# Patient Record
Sex: FEMALE | Race: WHITE | Employment: OTHER
[De-identification: names, ages, dates, MRNs, and addresses within clinical notes are randomized per-mention and may not be internally consistent; named-entity substitution may affect disease eponyms.]

---

## 2017-01-10 ENCOUNTER — OFFICE VISIT (OUTPATIENT)
Dept: FAMILY MEDICINE CLINIC | Facility: CLINIC | Age: 67
End: 2017-01-10

## 2017-01-10 VITALS
HEIGHT: 65 IN | WEIGHT: 202 LBS | SYSTOLIC BLOOD PRESSURE: 146 MMHG | HEART RATE: 65 BPM | BODY MASS INDEX: 33.66 KG/M2 | DIASTOLIC BLOOD PRESSURE: 78 MMHG

## 2017-01-10 DIAGNOSIS — K44.9 HIATAL HERNIA: Primary | ICD-10-CM

## 2017-01-10 DIAGNOSIS — E78.00 HYPERCHOLESTEREMIA: ICD-10-CM

## 2017-01-10 DIAGNOSIS — E55.9 VITAMIN D DEFICIENCY: ICD-10-CM

## 2017-01-10 PROCEDURE — 99213 OFFICE O/P EST LOW 20 MIN: CPT | Performed by: FAMILY MEDICINE

## 2017-01-10 RX ORDER — ATORVASTATIN CALCIUM 40 MG/1
TABLET, FILM COATED ORAL
Qty: 90 TABLET | Refills: 3 | Status: SHIPPED | OUTPATIENT
Start: 2017-01-10 | End: 2017-11-30 | Stop reason: SDUPTHER

## 2017-01-10 RX ORDER — LEVOTHYROXINE SODIUM 175 UG/1
TABLET ORAL
Qty: 97 TABLET | Refills: 2 | Status: SHIPPED | OUTPATIENT
Start: 2017-01-10 | End: 2017-09-07 | Stop reason: SDUPTHER

## 2017-01-10 RX ORDER — METOPROLOL SUCCINATE 50 MG/1
TABLET, EXTENDED RELEASE ORAL
Qty: 90 TABLET | Refills: 2 | Status: SHIPPED | OUTPATIENT
Start: 2017-01-10 | End: 2017-09-07 | Stop reason: SDUPTHER

## 2017-01-10 RX ORDER — NYSTATIN 100000 [USP'U]/G
POWDER TOPICAL
Qty: 90 G | Refills: 4 | Status: SHIPPED | OUTPATIENT
Start: 2017-01-10 | End: 2017-10-02 | Stop reason: SDUPTHER

## 2017-01-10 RX ORDER — OMEPRAZOLE 20 MG/1
CAPSULE, DELAYED RELEASE ORAL
Qty: 90 CAPSULE | Refills: 1 | Status: SHIPPED | OUTPATIENT
Start: 2017-01-10 | End: 2017-07-10 | Stop reason: SDUPTHER

## 2017-01-10 RX ORDER — VENLAFAXINE 37.5 MG/1
TABLET ORAL
Qty: 180 TABLET | Refills: 2 | Status: SHIPPED | OUTPATIENT
Start: 2017-01-10 | End: 2017-09-08 | Stop reason: SDUPTHER

## 2017-01-10 RX ORDER — ALENDRONATE SODIUM 70 MG/1
TABLET ORAL
Qty: 12 TABLET | Refills: 3 | Status: SHIPPED | OUTPATIENT
Start: 2017-01-10 | End: 2017-03-30 | Stop reason: ALTCHOICE

## 2017-01-10 ASSESSMENT — ENCOUNTER SYMPTOMS
COUGH: 0
DIARRHEA: 0
TROUBLE SWALLOWING: 0
CONSTIPATION: 0
RHINORRHEA: 0
SORE THROAT: 0
SHORTNESS OF BREATH: 0
NAUSEA: 0
BLOOD IN STOOL: 0
ABDOMINAL PAIN: 0
WHEEZING: 0
VOMITING: 0

## 2017-03-06 ENCOUNTER — HOSPITAL ENCOUNTER (OUTPATIENT)
Age: 67
Setting detail: SPECIMEN
Discharge: HOME | End: 2017-03-06
Payer: MEDICARE

## 2017-03-06 ENCOUNTER — TELEPHONE (OUTPATIENT)
Dept: FAMILY MEDICINE CLINIC | Facility: CLINIC | Age: 67
End: 2017-03-06

## 2017-03-06 DIAGNOSIS — R30.0 DYSURIA: ICD-10-CM

## 2017-03-06 DIAGNOSIS — R30.0 DYSURIA: Primary | ICD-10-CM

## 2017-03-06 DIAGNOSIS — R31.29 MICROHEMATURIA: ICD-10-CM

## 2017-03-06 LAB
-: ABNORMAL
AMORPHOUS: ABNORMAL
BACTERIA: ABNORMAL
BILIRUBIN URINE: NEGATIVE
CASTS UA: ABNORMAL /LPF (ref 0–8)
COLOR: YELLOW
CRYSTALS, UA: ABNORMAL /HPF
EPITHELIAL CELLS UA: ABNORMAL /HPF (ref 0–5)
GLUCOSE URINE: NEGATIVE
KETONES, URINE: NEGATIVE
LEUKOCYTE ESTERASE, URINE: NEGATIVE
MUCUS: ABNORMAL
NITRITE, URINE: NEGATIVE
OTHER OBSERVATIONS UA: ABNORMAL
PH UA: 8.5 (ref 5–8)
PROTEIN UA: NEGATIVE
RBC UA: ABNORMAL /HPF (ref 0–4)
RENAL EPITHELIAL, UA: ABNORMAL /HPF
SPECIFIC GRAVITY UA: 1.01 (ref 1–1.03)
TRICHOMONAS: ABNORMAL
TURBIDITY: CLEAR
URINE HGB: NEGATIVE
UROBILINOGEN, URINE: NORMAL
WBC UA: ABNORMAL /HPF (ref 0–5)
YEAST: ABNORMAL

## 2017-03-07 LAB
CULTURE: NORMAL
CULTURE: NORMAL
Lab: NORMAL
SPECIMEN DESCRIPTION: NORMAL
STATUS: NORMAL

## 2017-03-30 ENCOUNTER — HOSPITAL ENCOUNTER (OUTPATIENT)
Age: 67
Setting detail: SPECIMEN
Discharge: HOME | End: 2017-03-30
Payer: MEDICARE

## 2017-03-30 ENCOUNTER — OFFICE VISIT (OUTPATIENT)
Dept: UROLOGY | Age: 67
End: 2017-03-30
Payer: MEDICARE

## 2017-03-30 VITALS
DIASTOLIC BLOOD PRESSURE: 82 MMHG | HEART RATE: 66 BPM | WEIGHT: 202.82 LBS | BODY MASS INDEX: 33.79 KG/M2 | SYSTOLIC BLOOD PRESSURE: 137 MMHG | HEIGHT: 65 IN | TEMPERATURE: 98.1 F

## 2017-03-30 DIAGNOSIS — R31.29 MICROHEMATURIA: Primary | ICD-10-CM

## 2017-03-30 PROCEDURE — 99204 OFFICE O/P NEW MOD 45 MIN: CPT | Performed by: UROLOGY

## 2017-03-30 ASSESSMENT — ENCOUNTER SYMPTOMS
ABDOMINAL PAIN: 0
COLOR CHANGE: 0
EYE REDNESS: 0
NAUSEA: 0
VOMITING: 0
SHORTNESS OF BREATH: 0
BACK PAIN: 0
EYE PAIN: 0
COUGH: 0
WHEEZING: 0

## 2017-04-05 LAB — UROTHELIAL CANCER DETECTION: NORMAL

## 2017-04-13 ENCOUNTER — OFFICE VISIT (OUTPATIENT)
Dept: FAMILY MEDICINE CLINIC | Age: 67
End: 2017-04-13
Payer: MEDICARE

## 2017-04-13 VITALS
WEIGHT: 200 LBS | BODY MASS INDEX: 33.32 KG/M2 | SYSTOLIC BLOOD PRESSURE: 130 MMHG | HEART RATE: 63 BPM | DIASTOLIC BLOOD PRESSURE: 70 MMHG | RESPIRATION RATE: 18 BRPM | HEIGHT: 65 IN | OXYGEN SATURATION: 97 %

## 2017-04-13 DIAGNOSIS — E78.00 HYPERCHOLESTEREMIA: ICD-10-CM

## 2017-04-13 DIAGNOSIS — E55.9 VITAMIN D DEFICIENCY: ICD-10-CM

## 2017-04-13 DIAGNOSIS — K44.9 HIATAL HERNIA: Primary | ICD-10-CM

## 2017-04-13 DIAGNOSIS — R31.29 MICROHEMATURIA: ICD-10-CM

## 2017-04-13 PROCEDURE — 99213 OFFICE O/P EST LOW 20 MIN: CPT | Performed by: FAMILY MEDICINE

## 2017-04-13 ASSESSMENT — ENCOUNTER SYMPTOMS
VOMITING: 0
DIARRHEA: 0
ABDOMINAL PAIN: 0
WHEEZING: 0
SHORTNESS OF BREATH: 0
BLOOD IN STOOL: 0
COUGH: 0

## 2017-04-18 ENCOUNTER — HOSPITAL ENCOUNTER (OUTPATIENT)
Dept: ULTRASOUND IMAGING | Age: 67
Discharge: HOME | End: 2017-04-18
Payer: MEDICARE

## 2017-04-18 DIAGNOSIS — R31.29 MICROHEMATURIA: ICD-10-CM

## 2017-04-18 PROCEDURE — 76775 US EXAM ABDO BACK WALL LIM: CPT

## 2017-05-04 ENCOUNTER — OFFICE VISIT (OUTPATIENT)
Dept: UROLOGY | Age: 67
End: 2017-05-04
Payer: MEDICARE

## 2017-05-04 VITALS
SYSTOLIC BLOOD PRESSURE: 144 MMHG | DIASTOLIC BLOOD PRESSURE: 86 MMHG | HEART RATE: 63 BPM | TEMPERATURE: 98.2 F | WEIGHT: 200.62 LBS | HEIGHT: 65 IN | BODY MASS INDEX: 33.43 KG/M2

## 2017-05-04 DIAGNOSIS — R31.9 HEMATURIA: Primary | ICD-10-CM

## 2017-05-04 PROCEDURE — 99213 OFFICE O/P EST LOW 20 MIN: CPT | Performed by: UROLOGY

## 2017-05-04 ASSESSMENT — ENCOUNTER SYMPTOMS
NAUSEA: 0
VOMITING: 0
EYE REDNESS: 0
EYE PAIN: 0
WHEEZING: 0
ABDOMINAL PAIN: 0
SHORTNESS OF BREATH: 0
BACK PAIN: 0
COUGH: 0
COLOR CHANGE: 0

## 2017-07-11 RX ORDER — OMEPRAZOLE 20 MG/1
CAPSULE, DELAYED RELEASE ORAL
Qty: 90 CAPSULE | Refills: 1 | Status: SHIPPED | OUTPATIENT
Start: 2017-07-11 | End: 2017-12-11 | Stop reason: SDUPTHER

## 2017-07-14 ENCOUNTER — OFFICE VISIT (OUTPATIENT)
Dept: FAMILY MEDICINE CLINIC | Age: 67
End: 2017-07-14
Payer: MEDICARE

## 2017-07-14 VITALS
WEIGHT: 198 LBS | DIASTOLIC BLOOD PRESSURE: 80 MMHG | OXYGEN SATURATION: 97 % | SYSTOLIC BLOOD PRESSURE: 133 MMHG | HEART RATE: 78 BPM | HEIGHT: 66 IN | BODY MASS INDEX: 31.82 KG/M2

## 2017-07-14 DIAGNOSIS — Z00.00 HEALTHCARE MAINTENANCE: ICD-10-CM

## 2017-07-14 DIAGNOSIS — E89.0 POSTABLATIVE HYPOTHYROIDISM: ICD-10-CM

## 2017-07-14 DIAGNOSIS — I25.10 CORONARY ARTERY DISEASE INVOLVING NATIVE CORONARY ARTERY OF NATIVE HEART WITHOUT ANGINA PECTORIS: Primary | ICD-10-CM

## 2017-07-14 DIAGNOSIS — E78.00 HYPERCHOLESTEREMIA: ICD-10-CM

## 2017-07-14 DIAGNOSIS — Z23 NEED FOR PNEUMOCOCCAL VACCINE: ICD-10-CM

## 2017-07-14 PROCEDURE — 90732 PPSV23 VACC 2 YRS+ SUBQ/IM: CPT | Performed by: INTERNAL MEDICINE

## 2017-07-14 PROCEDURE — 99214 OFFICE O/P EST MOD 30 MIN: CPT | Performed by: INTERNAL MEDICINE

## 2017-07-14 PROCEDURE — G0009 ADMIN PNEUMOCOCCAL VACCINE: HCPCS | Performed by: INTERNAL MEDICINE

## 2017-07-14 ASSESSMENT — PATIENT HEALTH QUESTIONNAIRE - PHQ9
2. FEELING DOWN, DEPRESSED OR HOPELESS: 0
SUM OF ALL RESPONSES TO PHQ QUESTIONS 1-9: 0
SUM OF ALL RESPONSES TO PHQ9 QUESTIONS 1 & 2: 0
1. LITTLE INTEREST OR PLEASURE IN DOING THINGS: 0

## 2017-07-27 ENCOUNTER — HOSPITAL ENCOUNTER (OUTPATIENT)
Age: 67
Setting detail: SPECIMEN
Discharge: HOME | End: 2017-07-27
Payer: MEDICARE

## 2017-07-27 LAB
THYROXINE, FREE: 1.43 NG/DL (ref 0.93–1.7)
TSH SERPL DL<=0.05 MIU/L-ACNC: 0.37 MIU/L (ref 0.3–5)

## 2017-07-28 LAB
THYROGLOBULIN AB: <20 IU/ML (ref 0–40)
THYROGLOBULIN: 0.2 NG/ML (ref 0–63.4)

## 2017-08-02 ENCOUNTER — HOSPITAL ENCOUNTER (OUTPATIENT)
Age: 67
Setting detail: SPECIMEN
Discharge: HOME | End: 2017-08-02
Payer: MEDICARE

## 2017-08-02 LAB — CALCIUM SERPL-MCNC: 9 MG/DL (ref 8.6–10.4)

## 2017-09-07 RX ORDER — LEVOTHYROXINE SODIUM 175 UG/1
TABLET ORAL
Qty: 97 TABLET | Refills: 3 | Status: SHIPPED | OUTPATIENT
Start: 2017-09-07 | End: 2018-07-19 | Stop reason: SDUPTHER

## 2017-09-07 RX ORDER — METOPROLOL SUCCINATE 50 MG/1
TABLET, EXTENDED RELEASE ORAL
Qty: 90 TABLET | Refills: 5 | Status: SHIPPED | OUTPATIENT
Start: 2017-09-07 | End: 2018-10-10 | Stop reason: SDUPTHER

## 2017-09-08 RX ORDER — VENLAFAXINE 37.5 MG/1
TABLET ORAL
Qty: 180 TABLET | Refills: 2 | Status: SHIPPED | OUTPATIENT
Start: 2017-09-08 | End: 2018-05-10 | Stop reason: SDUPTHER

## 2017-09-28 ENCOUNTER — HOSPITAL ENCOUNTER (OUTPATIENT)
Age: 67
Setting detail: SPECIMEN
Discharge: HOME | End: 2017-09-28
Payer: MEDICARE

## 2017-09-28 LAB
CALCIUM IONIZED: 1.24 MMOL/L (ref 1.13–1.33)
PTH INTACT: 31.6 PG/ML (ref 15–65)
THYROXINE, FREE: 1.24 NG/DL (ref 0.93–1.7)
TSH SERPL DL<=0.05 MIU/L-ACNC: 3.25 MIU/L (ref 0.3–5)

## 2017-09-29 LAB
CALCIUM SERPL-MCNC: 9.5 MG/DL (ref 8.6–10.4)
VITAMIN D 25-HYDROXY: 40.8 NG/ML (ref 30–100)

## 2017-10-02 RX ORDER — NYSTATIN 100000 [USP'U]/G
POWDER TOPICAL
Qty: 90 G | Refills: 4 | Status: SHIPPED | OUTPATIENT
Start: 2017-10-02 | End: 2017-11-07 | Stop reason: SDUPTHER

## 2017-11-06 RX ORDER — NYSTATIN 100000 [USP'U]/G
POWDER TOPICAL
Qty: 90 G | Refills: 5 | OUTPATIENT
Start: 2017-11-06

## 2017-11-06 NOTE — TELEPHONE ENCOUNTER
From: Yu Pantoja  Sent: 11/4/2017 11:07 PM EDT  Subject: Medication Renewal Request    Yu Pantoja would like a refill of the following medications:  nystatin [facility]) [id number] UNIT/GM powder Annamarie Lynne NP]    Preferred pharmacy: [address] [place] [address] [phone] - F [phone]    Comment:

## 2017-11-07 RX ORDER — NYSTATIN 100000 [USP'U]/G
POWDER TOPICAL
Qty: 90 G | Refills: 4 | Status: SHIPPED | OUTPATIENT
Start: 2017-11-07 | End: 2018-07-19 | Stop reason: SDUPTHER

## 2017-11-07 NOTE — TELEPHONE ENCOUNTER
From: Nona Harvey  To: Sherice Stokes NP  Sent: 11/7/2017 9:19 AM EST  Subject: Medication Renewal Request    No. According to Sandeep Tellez, Dr. Chrissy Gusman office DID NOT renew my prescription back on October 1, and therefore the medication did not ship, and therefore I am now OUT. If there is a means for attaching a screenshot from [employer] confirming this, I will gladly send. Please address this oversight ASAP. Thx.  ----- Message -----  From: Franca Styles  Sent: 11/6/2017 9:13 AM EST  To: Nona Harvey  Subject: RE: Medication Renewal Request  Medication was denied because you filled on 10/2/17 with 4 refills. Call your pharmacy to ask for them to refill the medication you have 4 refills.     ----- Message -----   From: Nona Harvey   Sent: 11/4/2017 11:07 PM EDT   To: Jerry Garrison NP  Subject: Medication Renewal Request    Nona Harvey would like a refill of the following medications:  nystatin [facility]) [id number] UNIT/GM powder Jerry Garrison NP]    Preferred pharmacy: [address] [place] [facility] [phone] - F [phone]    Comment:

## 2017-11-15 ENCOUNTER — OFFICE VISIT (OUTPATIENT)
Dept: FAMILY MEDICINE CLINIC | Age: 67
End: 2017-11-15
Payer: MEDICARE

## 2017-11-15 VITALS
SYSTOLIC BLOOD PRESSURE: 130 MMHG | BODY MASS INDEX: 33.73 KG/M2 | TEMPERATURE: 97.6 F | HEART RATE: 70 BPM | DIASTOLIC BLOOD PRESSURE: 82 MMHG | WEIGHT: 205.8 LBS | OXYGEN SATURATION: 97 %

## 2017-11-15 DIAGNOSIS — Z23 NEED FOR INFLUENZA VACCINATION: ICD-10-CM

## 2017-11-15 DIAGNOSIS — Z00.00 HEALTH CARE MAINTENANCE: ICD-10-CM

## 2017-11-15 DIAGNOSIS — E89.0 POSTABLATIVE HYPOTHYROIDISM: ICD-10-CM

## 2017-11-15 DIAGNOSIS — I25.10 CORONARY ARTERY DISEASE INVOLVING NATIVE CORONARY ARTERY OF NATIVE HEART WITHOUT ANGINA PECTORIS: Primary | ICD-10-CM

## 2017-11-15 DIAGNOSIS — F33.41 RECURRENT MAJOR DEPRESSIVE DISORDER, IN PARTIAL REMISSION (HCC): ICD-10-CM

## 2017-11-15 DIAGNOSIS — R12 HEARTBURN: ICD-10-CM

## 2017-11-15 DIAGNOSIS — E78.00 HYPERCHOLESTEREMIA: ICD-10-CM

## 2017-11-15 DIAGNOSIS — M81.0 AGE RELATED OSTEOPOROSIS, UNSPECIFIED PATHOLOGICAL FRACTURE PRESENCE: ICD-10-CM

## 2017-11-15 PROCEDURE — 90662 IIV NO PRSV INCREASED AG IM: CPT | Performed by: INTERNAL MEDICINE

## 2017-11-15 PROCEDURE — 99214 OFFICE O/P EST MOD 30 MIN: CPT | Performed by: INTERNAL MEDICINE

## 2017-11-15 PROCEDURE — G0008 ADMIN INFLUENZA VIRUS VAC: HCPCS | Performed by: INTERNAL MEDICINE

## 2017-11-15 NOTE — PROGRESS NOTES
Subjective:       Patient ID: Mayte Melchor is a 79 y.o. female who presents for   Chief Complaint   Patient presents with    Flu Vaccine    Follow-up     thyroid   , and follow up of chronic medical problems. HPI:  Hypothyroidism  Patient complains of hypothyroidism. Current symptoms: none. Patient denies change in energy level, diarrhea, heat / cold intolerance, nervousness, palpitations and weight changes. Onset of symptoms was several years ago. Symptoms have been well-controlled. H/o thyroid cancer, following with endo. Additional Complaints:  Dyslipidemia  Patient presents for evaluation of lipids. Compliance with treatment thus far has been good. A repeat fasting lipid profile was done. The patient does use medications that may worsen dyslipidemias (corticosteroids, progestins, anabolic steroids, diuretics, beta-blockers, amiodarone, cyclosporine, olanzapine). The patient exercises frequently. The patient is not known to have coexisting coronary artery disease. Hypertension  Patient is here for follow-up of elevated blood pressure. She is exercising and is adherent to a low-salt diet. Blood pressure is well controlled at home. Cardiac symptoms: none. Patient denies chest pain, chest pressure/discomfort, claudication, dyspnea, exertional chest pressure/discomfort, fatigue, irregular heart beat, lower extremity edema, near-syncope, orthopnea and palpitations. Cardiovascular risk factors: advanced age (older than 54 for men, 72 for women), dyslipidemia, hypertension and obesity (BMI >= 30 kg/m2). Use of agents associated with hypertension: thyroid hormones. History of target organ damage: none. Hip fracture last year, completed rehab, doing well  Has had one dose of prolia so far. Remains on effexor for depression, working well. Has been on the omeprazole for a long time for heartburn, has tried pepcid years ago and it didn't.     Patient's medications, allergies, past medical, surgical, Anatsasia Tidwell MD   metoprolol succinate (TOPROL XL) 50 MG extended release tablet TAKE ONE TABLET BY MOUTH DAILY Yes Ludy Tidwell MD   levothyroxine (SYNTHROID) 175 MCG tablet TAKE ONE TABLET BY MOUTH DAILY EXCEPT ON Saturday TAKE ONE AND ONE-HALF TABLET AS DIRECTED Yes Ludy Tidwell MD   omeprazole (PRILOSEC) 20 MG delayed release capsule TAKE ONE CAPSULE BY MOUTH EVERY MORNING BEFORE BREAKFAST Yes Tapan Milian CNP   denosumab (PROLIA) 60 MG/ML SOLN SC injection Inject 60 mg into the skin once Every 6 months Yes Historical Provider, MD   vitamin D (CHOLECALCIFEROL) 1000 UNIT TABS tablet Take 1,000 Units by mouth daily Yes Historical Provider, MD   atorvastatin (LIPITOR) 40 MG tablet TAKE ONE TABLET BY MOUTH ONE TIME A DAY Yes Marion Tabares MD   ibuprofen (ADVIL;MOTRIN) 200 MG tablet Take 200 mg by mouth every 6 hours as needed for Pain. Yes Historical Provider, MD   calcium carbonate (OSCAL) 500 MG TABS tablet Take 500 mg by mouth 2 times daily. Yes Historical Provider, MD   vitamin E 400 UNIT capsule Take 400 Units by mouth daily. Yes Historical Provider, MD   aspirin 81 MG tablet Take 81 mg by mouth daily. Yes Historical Provider, MD          Assessment/Plan:       1. Coronary artery disease involving native coronary artery of native heart without angina pectoris  - continue Toprol XL 50mg QD and aspirin 81mg QD     2. Hypercholesteremia  - continue lipitor 40mg QHS     3. Postablative hypothyroidism  - continue synthroid 175mcg QD     4. Age related osteoporosis, unspecified pathological fracture presence  - continue prolia per gyn     5. Heartburn  - continue prilosec 20mg QD, consider H2 blocker trial [place]     6. Recurrent major depressive disorder, in partial remission (HCC)  - continue effexor 37.5mg PO BID    7. Health care maintenance  - INFLUENZA, HIGH DOSE, 65 YRS +, IM, PF, PREFILL SYR, 0.5ML (FLUZONE HD)    8.  Need for influenza vaccination  - INFLUENZA, HIGH DOSE, 65 YRS +, IM, PF, PREFILL SYR, 0.5ML (FLUZONE HD)             Electronically signed by Alma Borrero MD on 11/15/2017 at 1:36 PM

## 2017-11-30 RX ORDER — ATORVASTATIN CALCIUM 40 MG/1
TABLET, FILM COATED ORAL
Qty: 90 TABLET | Refills: 1 | Status: SHIPPED | OUTPATIENT
Start: 2017-11-30 | End: 2018-05-15 | Stop reason: SDUPTHER

## 2017-12-12 RX ORDER — OMEPRAZOLE 20 MG/1
CAPSULE, DELAYED RELEASE ORAL
Qty: 90 CAPSULE | Refills: 1 | Status: SHIPPED | OUTPATIENT
Start: 2017-12-12 | End: 2018-05-21 | Stop reason: SDUPTHER

## 2017-12-12 NOTE — TELEPHONE ENCOUNTER
Last filled 7/11/17 #90 with 1 RF  Last seen 11/15/17    Next Visit Date:  Future Appointments  Date Time Provider Michael Livingston   3/15/2018 1:00 PM Ludy Blackwell  [address] Maintenance   Topic Date Due    Breast cancer screen  05/10/2018    Lipid screen  01/26/2022    Colon cancer screen colonoscopy  11/07/2024    DTaP/Tdap/Td vaccine (2 - Td) 01/19/2025    Zostavax vaccine  Completed    DEXA (modify frequency per FRAX score)  Completed    Flu vaccine  Completed    Pneumococcal low/med risk  Completed    Hepatitis C screen  Completed       No results found for: LABA1C          ( goal A1C is < 7)   No results found for: LABMICR  LDL Cholesterol (mg/dL)   Date Value   01/26/2017 61       (goal LDL is <100)   AST (U/L)   Date Value   12/05/2016 23     ALT (U/L)   Date Value   12/05/2016 30     BUN (mg/dL)   Date Value   12/05/2016 13     BP Readings from Last 3 Encounters:   11/15/17 130/82   07/14/17 133/80   05/04/17 (!) 144/86          (goal 120/80)    All Future Testing planned in [facility]  Lab Frequency Next Occurrence               Patient Active Problem List:     CAD (coronary artery disease)     Hypercholesteremia     Hearing loss     Hiatal hernia     Proximal humerus fracture     Femoral fracture (HCC)

## 2018-02-14 ENCOUNTER — OFFICE VISIT (OUTPATIENT)
Dept: FAMILY MEDICINE CLINIC | Age: 68
End: 2018-02-14
Payer: MEDICARE

## 2018-02-14 VITALS
WEIGHT: 202.2 LBS | RESPIRATION RATE: 16 BRPM | BODY MASS INDEX: 32.5 KG/M2 | HEART RATE: 58 BPM | SYSTOLIC BLOOD PRESSURE: 126 MMHG | OXYGEN SATURATION: 98 % | TEMPERATURE: 97.2 F | DIASTOLIC BLOOD PRESSURE: 74 MMHG | HEIGHT: 66 IN

## 2018-02-14 DIAGNOSIS — R07.9 CHEST PAIN, UNSPECIFIED TYPE: ICD-10-CM

## 2018-02-14 DIAGNOSIS — R07.9 CHEST PAIN, UNSPECIFIED TYPE: Primary | ICD-10-CM

## 2018-02-14 PROCEDURE — 93000 ELECTROCARDIOGRAM COMPLETE: CPT | Performed by: INTERNAL MEDICINE

## 2018-02-14 PROCEDURE — 99213 OFFICE O/P EST LOW 20 MIN: CPT | Performed by: INTERNAL MEDICINE

## 2018-02-14 NOTE — PROGRESS NOTES
tenderness to palpation noted. Large pendulous breasts. Bra is too small, breasts spilling out of bra cups. Cardiovascular: PMI is not displaced, and no thrill noted. Regular rate and rhythm with no rub, murmur or gallop. There is no peripheral edema. Pedal pulses are normal.   Abdomen: Abdomen is soft and nontender. The bowel sounds are normal.   Musculoskeletal: There are no deformities of the the extremities. Patient has all ten fingers intact. The patient has full range of motion on all 4 extremities without pain. Skin: The skin is warm and dry. There are no rashes noted. Prior to Visit Medications    Medication Sig Taking? Authorizing Provider   Multiple Vitamins-Minerals (MULTIVITAMIN PO) Take by mouth Yes Historical Provider, MD   omeprazole (PRILOSEC) 20 MG delayed release capsule TAKE 1 CAPSULE EVERY MORNING BEFORE BREAKFAST Yes Ludy Jean Baptiste MD   atorvastatin (LIPITOR) 40 MG tablet TAKE 1 TABLET DAILY Yes Ludy Jean Baptiste MD   nystatin ([id number]) [id number] UNIT/GM powder APPLY TO AFFECTED AREA 3 TIMES A DAY Yes Ludy Jean Baptiste MD   venlafaxine (EFFEXOR) 37.5 MG tablet TAKE ONE TABLET BY MOUTH TWICE A DAY Yes Ludy Jean Baptiste MD   metoprolol succinate (TOPROL XL) 50 MG extended release tablet TAKE ONE TABLET BY MOUTH DAILY Yes Ludy Jean Baptiste MD   levothyroxine (SYNTHROID) 175 MCG tablet TAKE ONE TABLET BY MOUTH DAILY EXCEPT ON Saturday TAKE ONE AND ONE-HALF TABLET AS DIRECTED Yes Ludy Jean Baptiste MD   denosumab (PROLIA) 60 MG/ML SOLN SC injection Inject 60 mg into the skin once Every 6 months Yes Historical Provider, MD   vitamin D (CHOLECALCIFEROL) 1000 UNIT TABS tablet Take 1,000 Units by mouth daily Yes Historical Provider, MD   ibuprofen (ADVIL;MOTRIN) 200 MG tablet Take 200 mg by mouth every 6 hours as needed for Pain. Yes Historical Provider, MD   calcium carbonate (OSCAL) 500 MG TABS tablet Take 500 mg by mouth 2 times daily.  Yes Historical Provider, MD   vitamin E 400 UNIT capsule Take 400 Units by mouth daily. Yes Historical Provider, MD   aspirin 81 MG tablet Take 81 mg by mouth daily. Yes Historical Provider, MD       Data Review EKG unchanged from prior in 2016     Assessment/Plan:      1. Chest pain, unspecified type  Unlikely from cardiac source  More likely due to breast ligament strain   Bra fitting advised, reviewed with patient. - EKG 12 lead;  Future           Electronically signed by Tino Allen MD on 2/14/2018 at 3:34 PM

## 2018-02-22 ENCOUNTER — HOSPITAL ENCOUNTER (OUTPATIENT)
Age: 68
Setting detail: SPECIMEN
Discharge: HOME | End: 2018-02-22
Payer: MEDICARE

## 2018-02-22 LAB
CA 125: 19 U/ML
PREALBUMIN: 29.7 MG/DL (ref 20–40)

## 2018-03-16 ENCOUNTER — OFFICE VISIT (OUTPATIENT)
Dept: FAMILY MEDICINE CLINIC | Age: 68
End: 2018-03-16
Payer: MEDICARE

## 2018-03-16 VITALS
OXYGEN SATURATION: 96 % | WEIGHT: 204.2 LBS | RESPIRATION RATE: 16 BRPM | DIASTOLIC BLOOD PRESSURE: 80 MMHG | TEMPERATURE: 96.2 F | BODY MASS INDEX: 33.45 KG/M2 | SYSTOLIC BLOOD PRESSURE: 142 MMHG | HEART RATE: 63 BPM

## 2018-03-16 DIAGNOSIS — E03.9 ACQUIRED HYPOTHYROIDISM: ICD-10-CM

## 2018-03-16 DIAGNOSIS — I25.10 CORONARY ARTERY DISEASE INVOLVING NATIVE CORONARY ARTERY OF NATIVE HEART WITHOUT ANGINA PECTORIS: ICD-10-CM

## 2018-03-16 DIAGNOSIS — M81.0 OSTEOPOROSIS WITHOUT CURRENT PATHOLOGICAL FRACTURE, UNSPECIFIED OSTEOPOROSIS TYPE: ICD-10-CM

## 2018-03-16 DIAGNOSIS — E78.00 HYPERCHOLESTEREMIA: Primary | ICD-10-CM

## 2018-03-16 DIAGNOSIS — I10 ESSENTIAL HYPERTENSION: ICD-10-CM

## 2018-03-16 PROCEDURE — 99214 OFFICE O/P EST MOD 30 MIN: CPT | Performed by: INTERNAL MEDICINE

## 2018-03-29 ENCOUNTER — OFFICE VISIT (OUTPATIENT)
Dept: UROLOGY | Age: 68
End: 2018-03-29
Payer: MEDICARE

## 2018-03-29 VITALS
SYSTOLIC BLOOD PRESSURE: 145 MMHG | DIASTOLIC BLOOD PRESSURE: 89 MMHG | TEMPERATURE: 98.3 F | HEIGHT: 66 IN | WEIGHT: 205 LBS | HEART RATE: 61 BPM | BODY MASS INDEX: 32.95 KG/M2

## 2018-03-29 DIAGNOSIS — N28.1 RENAL CYSTS, ACQUIRED, BILATERAL: ICD-10-CM

## 2018-03-29 DIAGNOSIS — R31.21 ASYMPTOMATIC MICROSCOPIC HEMATURIA: Primary | ICD-10-CM

## 2018-03-29 PROCEDURE — 99213 OFFICE O/P EST LOW 20 MIN: CPT | Performed by: UROLOGY

## 2018-03-29 ASSESSMENT — ENCOUNTER SYMPTOMS
BACK PAIN: 0
EYE PAIN: 0
COUGH: 0
VOMITING: 0
COLOR CHANGE: 0
NAUSEA: 0
EYE REDNESS: 0
WHEEZING: 0
ABDOMINAL PAIN: 0
SHORTNESS OF BREATH: 0

## 2018-03-29 NOTE — PROGRESS NOTES
Constitutional: Negative for appetite change, chills and fever. Eyes: Negative for pain, redness and visual disturbance. Respiratory: Negative for cough, shortness of breath and wheezing. Cardiovascular: Negative for chest pain and leg swelling. Gastrointestinal: Negative for abdominal pain, nausea and vomiting. Genitourinary: Negative for difficulty urinating, dysuria, flank pain, frequency, hematuria, pelvic pain, vaginal bleeding and vaginal discharge. Musculoskeletal: Negative for back pain, joint swelling and myalgias. Skin: Negative for color change, rash and wound. Neurological: Negative for dizziness, tremors and numbness. Hematological: Negative for adenopathy. Does not bruise/bleed easily. Physical Exam:      Vitals:    03/29/18 1012   BP: (!) 145/89   Pulse: 61   Temp:      Body mass index is 33.58 kg/m². Patient is a 79 y.o. female in no acute distress and alert and oriented to person, place and time. Physical Exam  Constitutional: Patient in no acute distress. Neuro: Alert and oriented to person, place and time. Psych: Mood normal, affect normal  Skin: No rash noted  HEENT: Head: Normocephalic and atraumatic  Conjunctivae and EOM are normal. Pupils are equal, round  Nose: Normal  Right External Ear: Normal; Left External Ear: Normal  Mouth: Mucosa Moist  Neck: Supple  Lungs: Respiratory effort is normal  Cardiovascular: Warm & Pink  Abdomen: Soft, non-tender, non-distended with no CVA,  No flank tenderness,  Or hepatosplenomegaly   Lymphatics: No palpable lymphadenopathy. Bladder non-tender and not distended. Musculoskeletal: Normal gait and station      Assessment and Plan      1. Asymptomatic microscopic hematuria    2. Renal cysts, acquired, bilateral           Plan:    will monitor renal cysts, not concerning at this time  Return in about 1 year (around 3/29/2019) for Follow up.     Prescriptions Ordered:  No orders of the defined types were placed in this

## 2018-05-10 RX ORDER — VENLAFAXINE 37.5 MG/1
TABLET ORAL
Qty: 180 TABLET | Refills: 2 | Status: SHIPPED | OUTPATIENT
Start: 2018-05-10 | End: 2018-05-23 | Stop reason: SDUPTHER

## 2018-05-15 RX ORDER — ATORVASTATIN CALCIUM 40 MG/1
40 TABLET, FILM COATED ORAL DAILY
Qty: 90 TABLET | Refills: 1 | Status: SHIPPED | OUTPATIENT
Start: 2018-05-15 | End: 2018-10-18 | Stop reason: SDUPTHER

## 2018-05-22 RX ORDER — OMEPRAZOLE 20 MG/1
CAPSULE, DELAYED RELEASE ORAL
Qty: 90 CAPSULE | Refills: 1 | Status: SHIPPED | OUTPATIENT
Start: 2018-05-22 | End: 2018-11-19 | Stop reason: SDUPTHER

## 2018-05-23 ENCOUNTER — PATIENT MESSAGE (OUTPATIENT)
Dept: FAMILY MEDICINE CLINIC | Age: 68
End: 2018-05-23

## 2018-05-23 RX ORDER — VENLAFAXINE 37.5 MG/1
TABLET ORAL
Qty: 180 TABLET | Refills: 1 | Status: SHIPPED | OUTPATIENT
Start: 2018-05-23 | End: 2018-11-19 | Stop reason: SDUPTHER

## 2018-06-19 ENCOUNTER — OFFICE VISIT (OUTPATIENT)
Dept: FAMILY MEDICINE CLINIC | Age: 68
End: 2018-06-19
Payer: MEDICARE

## 2018-06-19 VITALS
OXYGEN SATURATION: 96 % | HEART RATE: 65 BPM | RESPIRATION RATE: 16 BRPM | WEIGHT: 202.6 LBS | TEMPERATURE: 98.4 F | DIASTOLIC BLOOD PRESSURE: 70 MMHG | BODY MASS INDEX: 33.19 KG/M2 | SYSTOLIC BLOOD PRESSURE: 112 MMHG

## 2018-06-19 DIAGNOSIS — E78.00 HYPERCHOLESTEREMIA: ICD-10-CM

## 2018-06-19 DIAGNOSIS — M81.0 OSTEOPOROSIS WITHOUT CURRENT PATHOLOGICAL FRACTURE, UNSPECIFIED OSTEOPOROSIS TYPE: ICD-10-CM

## 2018-06-19 DIAGNOSIS — H91.93 BILATERAL HEARING LOSS, UNSPECIFIED HEARING LOSS TYPE: ICD-10-CM

## 2018-06-19 DIAGNOSIS — E03.9 ACQUIRED HYPOTHYROIDISM: ICD-10-CM

## 2018-06-19 DIAGNOSIS — L30.0 NUMMULAR ECZEMA: ICD-10-CM

## 2018-06-19 DIAGNOSIS — I25.10 CORONARY ARTERY DISEASE INVOLVING NATIVE CORONARY ARTERY OF NATIVE HEART WITHOUT ANGINA PECTORIS: Primary | ICD-10-CM

## 2018-06-19 PROCEDURE — 99214 OFFICE O/P EST MOD 30 MIN: CPT | Performed by: INTERNAL MEDICINE

## 2018-07-19 RX ORDER — LEVOTHYROXINE SODIUM 175 UG/1
TABLET ORAL
Qty: 30 TABLET | Refills: 0 | Status: SHIPPED | OUTPATIENT
Start: 2018-07-19

## 2018-07-19 RX ORDER — NYSTATIN 100000 [USP'U]/G
POWDER TOPICAL
Qty: 90 G | Refills: 4 | Status: SHIPPED | OUTPATIENT
Start: 2018-07-19 | End: 2018-08-08 | Stop reason: SDUPTHER

## 2018-07-24 ENCOUNTER — PATIENT MESSAGE (OUTPATIENT)
Dept: FAMILY MEDICINE CLINIC | Age: 68
End: 2018-07-24

## 2018-08-02 ENCOUNTER — HOSPITAL ENCOUNTER (OUTPATIENT)
Age: 68
Setting detail: SPECIMEN
Discharge: HOME | End: 2018-08-02
Payer: MEDICARE

## 2018-08-02 DIAGNOSIS — M81.0 OSTEOPOROSIS WITHOUT CURRENT PATHOLOGICAL FRACTURE, UNSPECIFIED OSTEOPOROSIS TYPE: ICD-10-CM

## 2018-08-02 DIAGNOSIS — E03.9 ACQUIRED HYPOTHYROIDISM: ICD-10-CM

## 2018-08-02 DIAGNOSIS — E78.00 HYPERCHOLESTEREMIA: ICD-10-CM

## 2018-08-02 LAB
ALBUMIN SERPL-MCNC: 4.4 G/DL (ref 3.5–5.2)
ALBUMIN/GLOBULIN RATIO: 1.6 (ref 1–2.5)
ALP BLD-CCNC: 55 U/L (ref 35–104)
ALT SERPL-CCNC: 20 U/L (ref 5–33)
ANION GAP SERPL CALCULATED.3IONS-SCNC: 14 MMOL/L (ref 9–17)
AST SERPL-CCNC: 20 U/L
BILIRUB SERPL-MCNC: 0.4 MG/DL (ref 0.3–1.2)
BUN BLDV-MCNC: 14 MG/DL (ref 8–23)
BUN/CREAT BLD: NORMAL (ref 9–20)
CALCIUM SERPL-MCNC: 8.8 MG/DL (ref 8.6–10.4)
CHLORIDE BLD-SCNC: 101 MMOL/L (ref 98–107)
CHOLESTEROL, FASTING: 276 MG/DL
CHOLESTEROL/HDL RATIO: 4.9
CO2: 27 MMOL/L (ref 20–31)
CREAT SERPL-MCNC: 0.7 MG/DL (ref 0.5–0.9)
GFR AFRICAN AMERICAN: >60 ML/MIN
GFR NON-AFRICAN AMERICAN: >60 ML/MIN
GFR SERPL CREATININE-BSD FRML MDRD: NORMAL ML/MIN/{1.73_M2}
GFR SERPL CREATININE-BSD FRML MDRD: NORMAL ML/MIN/{1.73_M2}
GLUCOSE BLD-MCNC: 94 MG/DL (ref 70–99)
HDLC SERPL-MCNC: 56 MG/DL
LDL CHOLESTEROL: 169 MG/DL (ref 0–130)
POTASSIUM SERPL-SCNC: 4.4 MMOL/L (ref 3.7–5.3)
SODIUM BLD-SCNC: 142 MMOL/L (ref 135–144)
THYROXINE, FREE: 1.27 NG/DL (ref 0.93–1.7)
THYROXINE, FREE: 1.27 NG/DL (ref 0.93–1.7)
TOTAL PROTEIN: 7.2 G/DL (ref 6.4–8.3)
TRIGLYCERIDE, FASTING: 254 MG/DL
TSH SERPL DL<=0.05 MIU/L-ACNC: 10.03 MIU/L (ref 0.3–5)
TSH SERPL DL<=0.05 MIU/L-ACNC: 10.03 MIU/L (ref 0.3–5)
VITAMIN D 25-HYDROXY: 32 NG/ML (ref 30–100)
VLDLC SERPL CALC-MCNC: ABNORMAL MG/DL (ref 1–30)

## 2018-08-03 LAB
THYROGLOBULIN AB: <20 IU/ML (ref 0–40)
THYROID PEROXIDASE (TPO) AB: <10 IU/ML (ref 0–35)

## 2018-08-08 RX ORDER — NYSTATIN 100000 [USP'U]/G
POWDER TOPICAL
Qty: 45 G | Refills: 5 | Status: SHIPPED | OUTPATIENT
Start: 2018-08-08 | End: 2018-10-19 | Stop reason: SDUPTHER

## 2018-08-16 DIAGNOSIS — E03.9 ACQUIRED HYPOTHYROIDISM: Primary | ICD-10-CM

## 2018-08-22 ENCOUNTER — TELEPHONE (OUTPATIENT)
Dept: FAMILY MEDICINE CLINIC | Age: 68
End: 2018-08-22

## 2018-08-22 NOTE — TELEPHONE ENCOUNTER
Pt returning call re thyroid tests. Pt sees Dr Maryam Ray and he is managing Thyroid. He is having her take an extra 1/2 pill on Saturday and he will recheck in the future.

## 2018-10-10 RX ORDER — METOPROLOL SUCCINATE 50 MG/1
TABLET, EXTENDED RELEASE ORAL
Qty: 90 TABLET | Refills: 1 | Status: SHIPPED | OUTPATIENT
Start: 2018-10-10 | End: 2019-05-01 | Stop reason: SDUPTHER

## 2018-10-19 ENCOUNTER — OFFICE VISIT (OUTPATIENT)
Dept: FAMILY MEDICINE CLINIC | Age: 68
End: 2018-10-19
Payer: MEDICARE

## 2018-10-19 VITALS
RESPIRATION RATE: 16 BRPM | SYSTOLIC BLOOD PRESSURE: 116 MMHG | OXYGEN SATURATION: 95 % | TEMPERATURE: 97.1 F | DIASTOLIC BLOOD PRESSURE: 74 MMHG | HEART RATE: 66 BPM | WEIGHT: 196.6 LBS | BODY MASS INDEX: 32.21 KG/M2

## 2018-10-19 DIAGNOSIS — I25.10 CORONARY ARTERY DISEASE INVOLVING NATIVE CORONARY ARTERY OF NATIVE HEART WITHOUT ANGINA PECTORIS: Primary | ICD-10-CM

## 2018-10-19 DIAGNOSIS — E78.00 HYPERCHOLESTEREMIA: ICD-10-CM

## 2018-10-19 DIAGNOSIS — B37.2 CUTANEOUS CANDIDIASIS: ICD-10-CM

## 2018-10-19 DIAGNOSIS — M81.0 AGE-RELATED OSTEOPOROSIS WITHOUT CURRENT PATHOLOGICAL FRACTURE: ICD-10-CM

## 2018-10-19 DIAGNOSIS — E03.9 ACQUIRED HYPOTHYROIDISM: ICD-10-CM

## 2018-10-19 DIAGNOSIS — H91.93 BILATERAL HEARING LOSS, UNSPECIFIED HEARING LOSS TYPE: ICD-10-CM

## 2018-10-19 DIAGNOSIS — Z87.81 HISTORY OF FEMUR FRACTURE: ICD-10-CM

## 2018-10-19 PROCEDURE — 99214 OFFICE O/P EST MOD 30 MIN: CPT | Performed by: INTERNAL MEDICINE

## 2018-10-19 RX ORDER — ATORVASTATIN CALCIUM 40 MG/1
40 TABLET, FILM COATED ORAL DAILY
Qty: 90 TABLET | Refills: 1 | Status: SHIPPED | OUTPATIENT
Start: 2018-10-19 | End: 2019-08-09 | Stop reason: SDUPTHER

## 2018-10-19 RX ORDER — NYSTATIN 100000 [USP'U]/G
POWDER TOPICAL
Qty: 45 G | Refills: 5 | Status: SHIPPED | OUTPATIENT
Start: 2018-10-19 | End: 2019-05-07 | Stop reason: SDUPTHER

## 2018-10-19 NOTE — PROGRESS NOTES
allergies, past medical, surgical, social and family histories were reviewed and updated as appropriate. Social History   Substance Use Topics    Smoking status: Never Smoker    Smokeless tobacco: Never Used    Alcohol use No        Review of Systems  Energy level good overall, and weight is stable. No chest pain or shortness of breath. Bowels have been normal without constipation or diarrhea       Objective:          /74 (Site: Right Upper Arm, Position: Sitting, Cuff Size: Medium Adult)   Pulse 66   Temp 97.1 °F (36.2 °C) (Oral)   Resp 16   Wt 196 lb 9.6 oz (89.2 kg)   SpO2 95%   BMI 32.21 kg/m²     General: Alert and oriented, in no distress. Patient ambulating with normal gait. Normal body habitus. Chest: clear with no wheezes or rales. No retractions, or use of accessory muscles noted. Cardiovascular: PMI is not displaced, and no thrill noted. Regular rate and rhythm with no rub, murmur or gallop. There is no peripheral edema. Pedal pulses are normal.   Abdomen: Abdomen is soft and nontender. There is no organomegaly based on exam by palpation. There is no abdominal obesity present. The bowel sounds are normal.   Musculoskeletal: There are no deformities of the the extremities. Patient has all ten fingers intact. The patient has full range of motion on all 4 extremities without pain. Skin: The skin is warm and dry. There is a 3cm patch of nummular eczema on the left thigh with mild excoriation. Prior to Visit Medications    Medication Sig Taking?  Authorizing Provider   atorvastatin (LIPITOR) 40 MG tablet Take 1 tablet by mouth daily Yes Ludy Aguilar MD   metoprolol succinate (TOPROL XL) 50 MG extended release tablet TAKE ONE TABLET BY MOUTH DAILY Yes Ludy Aguilar MD   nystatin ([id number]) [id number] UNIT/GM powder APPLY TO AFFECTED AREA 3 TIMES A DAY Yes AILYN Villanueva - CNP   levothyroxine (SYNTHROID) 175 MCG tablet TAKE ONE TABLET BY MOUTH DAILY EXCEPT ON

## 2018-11-19 RX ORDER — VENLAFAXINE 37.5 MG/1
TABLET ORAL
Qty: 180 TABLET | Refills: 1 | Status: SHIPPED | OUTPATIENT
Start: 2018-11-19 | End: 2019-04-05 | Stop reason: SDUPTHER

## 2018-11-19 RX ORDER — OMEPRAZOLE 20 MG/1
CAPSULE, DELAYED RELEASE ORAL
Qty: 90 CAPSULE | Refills: 1 | Status: SHIPPED | OUTPATIENT
Start: 2018-11-19 | End: 2019-04-05 | Stop reason: SDUPTHER

## 2018-11-20 ENCOUNTER — HOSPITAL ENCOUNTER (OUTPATIENT)
Age: 68
Setting detail: SPECIMEN
Discharge: HOME | End: 2018-11-20
Payer: MEDICARE

## 2018-11-20 ENCOUNTER — NURSE ONLY (OUTPATIENT)
Dept: FAMILY MEDICINE CLINIC | Age: 68
End: 2018-11-20
Payer: MEDICARE

## 2018-11-20 VITALS — TEMPERATURE: 97.9 F

## 2018-11-20 DIAGNOSIS — E78.00 HYPERCHOLESTEREMIA: ICD-10-CM

## 2018-11-20 DIAGNOSIS — Z23 NEED FOR INFLUENZA VACCINATION: Primary | ICD-10-CM

## 2018-11-20 DIAGNOSIS — E03.9 ACQUIRED HYPOTHYROIDISM: ICD-10-CM

## 2018-11-20 LAB
ALBUMIN SERPL-MCNC: 4.4 G/DL (ref 3.5–5.2)
ALBUMIN/GLOBULIN RATIO: 1.8 (ref 1–2.5)
ALP BLD-CCNC: 53 U/L (ref 35–104)
ALT SERPL-CCNC: 22 U/L (ref 5–33)
ANION GAP SERPL CALCULATED.3IONS-SCNC: 15 MMOL/L (ref 9–17)
AST SERPL-CCNC: 20 U/L
BILIRUB SERPL-MCNC: 0.56 MG/DL (ref 0.3–1.2)
BUN BLDV-MCNC: 10 MG/DL (ref 8–23)
BUN/CREAT BLD: NORMAL (ref 9–20)
CALCIUM SERPL-MCNC: 8.7 MG/DL (ref 8.6–10.4)
CHLORIDE BLD-SCNC: 102 MMOL/L (ref 98–107)
CHOLESTEROL, FASTING: 143 MG/DL
CHOLESTEROL/HDL RATIO: 2.6
CO2: 26 MMOL/L (ref 20–31)
CREAT SERPL-MCNC: 0.64 MG/DL (ref 0.5–0.9)
GFR AFRICAN AMERICAN: >60 ML/MIN
GFR NON-AFRICAN AMERICAN: >60 ML/MIN
GFR SERPL CREATININE-BSD FRML MDRD: NORMAL ML/MIN/{1.73_M2}
GFR SERPL CREATININE-BSD FRML MDRD: NORMAL ML/MIN/{1.73_M2}
GLUCOSE BLD-MCNC: 94 MG/DL (ref 70–99)
HDLC SERPL-MCNC: 56 MG/DL
LDL CHOLESTEROL: 52 MG/DL (ref 0–130)
POTASSIUM SERPL-SCNC: 4.1 MMOL/L (ref 3.7–5.3)
SODIUM BLD-SCNC: 143 MMOL/L (ref 135–144)
TOTAL PROTEIN: 6.9 G/DL (ref 6.4–8.3)
TRIGLYCERIDE, FASTING: 176 MG/DL
TSH SERPL DL<=0.05 MIU/L-ACNC: 0.67 MIU/L (ref 0.3–5)
VLDLC SERPL CALC-MCNC: ABNORMAL MG/DL (ref 1–30)

## 2018-11-20 PROCEDURE — 90662 IIV NO PRSV INCREASED AG IM: CPT | Performed by: NURSE PRACTITIONER

## 2018-11-20 PROCEDURE — G0008 ADMIN INFLUENZA VIRUS VAC: HCPCS | Performed by: NURSE PRACTITIONER

## 2019-01-25 ENCOUNTER — OFFICE VISIT (OUTPATIENT)
Dept: FAMILY MEDICINE CLINIC | Age: 69
End: 2019-01-25
Payer: MEDICARE

## 2019-01-25 VITALS
BODY MASS INDEX: 32.47 KG/M2 | TEMPERATURE: 97.6 F | HEART RATE: 68 BPM | OXYGEN SATURATION: 97 % | DIASTOLIC BLOOD PRESSURE: 80 MMHG | SYSTOLIC BLOOD PRESSURE: 114 MMHG | RESPIRATION RATE: 16 BRPM | WEIGHT: 198.2 LBS

## 2019-01-25 DIAGNOSIS — M79.671 RIGHT FOOT PAIN: ICD-10-CM

## 2019-01-25 DIAGNOSIS — E78.00 HYPERCHOLESTEREMIA: ICD-10-CM

## 2019-01-25 DIAGNOSIS — L65.9 HAIR LOSS: ICD-10-CM

## 2019-01-25 DIAGNOSIS — I25.10 CORONARY ARTERY DISEASE INVOLVING NATIVE CORONARY ARTERY OF NATIVE HEART WITHOUT ANGINA PECTORIS: Primary | ICD-10-CM

## 2019-01-25 DIAGNOSIS — H91.93 BILATERAL HEARING LOSS, UNSPECIFIED HEARING LOSS TYPE: ICD-10-CM

## 2019-01-25 DIAGNOSIS — E03.9 ACQUIRED HYPOTHYROIDISM: ICD-10-CM

## 2019-01-25 PROCEDURE — 99214 OFFICE O/P EST MOD 30 MIN: CPT | Performed by: INTERNAL MEDICINE

## 2019-01-25 ASSESSMENT — PATIENT HEALTH QUESTIONNAIRE - PHQ9
2. FEELING DOWN, DEPRESSED OR HOPELESS: 0
SUM OF ALL RESPONSES TO PHQ QUESTIONS 1-9: 0
1. LITTLE INTEREST OR PLEASURE IN DOING THINGS: 0
SUM OF ALL RESPONSES TO PHQ9 QUESTIONS 1 & 2: 0
SUM OF ALL RESPONSES TO PHQ QUESTIONS 1-9: 0

## 2019-04-04 ENCOUNTER — PATIENT MESSAGE (OUTPATIENT)
Dept: FAMILY MEDICINE CLINIC | Age: 69
End: 2019-04-04

## 2019-04-05 RX ORDER — OMEPRAZOLE 20 MG/1
CAPSULE, DELAYED RELEASE ORAL
Qty: 90 CAPSULE | Refills: 1 | Status: SHIPPED | OUTPATIENT
Start: 2019-04-05 | End: 2019-06-11 | Stop reason: SDUPTHER

## 2019-04-05 RX ORDER — VENLAFAXINE 37.5 MG/1
TABLET ORAL
Qty: 180 TABLET | Refills: 1 | Status: SHIPPED | OUTPATIENT
Start: 2019-04-05 | End: 2019-06-06 | Stop reason: SDUPTHER

## 2019-05-01 RX ORDER — METOPROLOL SUCCINATE 50 MG/1
TABLET, EXTENDED RELEASE ORAL
Qty: 90 TABLET | Refills: 1 | Status: SHIPPED | OUTPATIENT
Start: 2019-05-01 | End: 2019-09-30 | Stop reason: SDUPTHER

## 2019-05-07 DIAGNOSIS — B37.2 CUTANEOUS CANDIDIASIS: ICD-10-CM

## 2019-05-07 RX ORDER — NYSTATIN 100000 [USP'U]/G
POWDER TOPICAL
Qty: 45 G | Refills: 5 | Status: SHIPPED | OUTPATIENT
Start: 2019-05-07 | End: 2020-01-15

## 2019-05-24 ENCOUNTER — OFFICE VISIT (OUTPATIENT)
Dept: FAMILY MEDICINE CLINIC | Age: 69
End: 2019-05-24
Payer: MEDICARE

## 2019-05-24 VITALS
HEART RATE: 71 BPM | TEMPERATURE: 97.2 F | HEIGHT: 60 IN | DIASTOLIC BLOOD PRESSURE: 70 MMHG | BODY MASS INDEX: 38.64 KG/M2 | WEIGHT: 196.8 LBS | SYSTOLIC BLOOD PRESSURE: 122 MMHG | OXYGEN SATURATION: 95 %

## 2019-05-24 DIAGNOSIS — M81.0 AGE-RELATED OSTEOPOROSIS WITHOUT CURRENT PATHOLOGICAL FRACTURE: ICD-10-CM

## 2019-05-24 DIAGNOSIS — K44.9 HIATAL HERNIA: ICD-10-CM

## 2019-05-24 DIAGNOSIS — I25.10 CORONARY ARTERY DISEASE INVOLVING NATIVE CORONARY ARTERY OF NATIVE HEART WITHOUT ANGINA PECTORIS: Primary | ICD-10-CM

## 2019-05-24 DIAGNOSIS — E03.9 ACQUIRED HYPOTHYROIDISM: ICD-10-CM

## 2019-05-24 DIAGNOSIS — H91.93 BILATERAL HEARING LOSS, UNSPECIFIED HEARING LOSS TYPE: ICD-10-CM

## 2019-05-24 DIAGNOSIS — E78.00 HYPERCHOLESTEREMIA: ICD-10-CM

## 2019-05-24 PROCEDURE — 99214 OFFICE O/P EST MOD 30 MIN: CPT | Performed by: INTERNAL MEDICINE

## 2019-05-24 NOTE — PROGRESS NOTES
Subjective:       Patient ID: Xu Samuel is a 76 y.o. female who presents for   Chief Complaint   Patient presents with    Follow-up     4 month f/u   , and follow up of chronic medical problems. HPI:  Hypothyroidism  Patient complains of hypothyroidism. Current symptoms: none. Patient denies change in energy level, diarrhea, heat / cold intolerance, nervousness, palpitations and weight changes. Onset of symptoms was several years ago. Symptoms have been well-controlled. H/o thyroid cancer, following with endo. Additional Complaints:  Dyslipidemia  Patient presents for evaluation of lipids. Compliance with treatment thus far has been good. A repeat fasting lipid profile was done. The patient does use medications that may worsen dyslipidemias (corticosteroids, progestins, anabolic steroids, diuretics, beta-blockers, amiodarone, cyclosporine, olanzapine). The patient exercises frequently. The patient is not known to have coexisting coronary artery disease. Hypertension  Patient is here for follow-up of elevated blood pressure. She is exercising and is adherent to a low-salt diet. Blood pressure is well controlled at home. Cardiac symptoms: none. Patient denies chest pain, chest pressure/discomfort, claudication, dyspnea, exertional chest pressure/discomfort, fatigue, irregular heart beat, lower extremity edema, near-syncope, orthopnea and palpitations. Cardiovascular risk factors: advanced age (older than 54 for men, 72 for women), dyslipidemia, hypertension and obesity (BMI >= 30 kg/m2). Use of agents associated with hypertension: thyroid hormones. History of target organ damage: none. On prolia - following with gyn   Remains on effexor for depression, working well.    Has been on the omeprazole for a long time for heartburn, has tried pepcid years ago and it didn't work --> continues to work well for her, she denies melena, dysphagia, hematochezia   Just came back from a [place] on the [place] and the [facility]   Mammogram is scheduled at [address] [place] [address] for right foot pain. Patient's medications, allergies, past medical, surgical, social and family histories were reviewed and updated as appropriate. Social History     Tobacco Use    Smoking status: Never Smoker    Smokeless tobacco: Never Used   Substance Use Topics    Alcohol use: No        Review of Systems  Energy level good overall, and weight is stable. No chest pain or shortness of breath. Bowels have been normal without constipation or diarrhea       Objective:          /70 (Site: Left Upper Arm, Position: Sitting, Cuff Size: Medium Adult)   Pulse 71   Temp 97.2 °F (36.2 °C) (Oral)   Ht 5' (1.524 m)   Wt 196 lb 12.8 oz (89.3 kg)   SpO2 95%   BMI 38.43 kg/m²     General: Alert and oriented, in no distress. Patient ambulating with normal gait. Normal body habitus. Chest: clear with no wheezes or rales. No retractions, or use of accessory muscles noted. Cardiovascular: PMI is not displaced, and no thrill noted. Regular rate and rhythm with no rub, murmur or gallop. There is no peripheral edema. Pedal pulses are normal.   Abdomen: Abdomen is soft and nontender. There is no organomegaly based on exam by palpation. There is no abdominal obesity present. The bowel sounds are normal.   Musculoskeletal: There are no deformities of the the extremities. Patient has all ten fingers intact. The patient has full range of motion on all 4 extremities without pain. Skin: The skin is warm and dry. Prior to Visit Medications    Medication Sig Taking?  Authorizing Provider   nystatin ([address]) [id number] UNIT/GM powder APPLY TO AFFECTED AREA THREE TIMES A DAY Yes Ludy Marin MD   metoprolol succinate (TOPROL XL) 50 MG extended release tablet TAKE 1 TABLET DAILY Yes Ludy Marin MD   venlafaxine (EFFEXOR) 37.5 MG tablet TAKE ONE TABLET BY MOUTH TWICE A DAY Yes Johnnie John MD omeprazole (PRILOSEC) 20 MG delayed release capsule TAKE 1 CAPSULE EVERY MORNING BEFORE BREAKFAST Yes Ludy Owen MD   atorvastatin (LIPITOR) 40 MG tablet Take 1 tablet by mouth daily Yes Ludy Owen MD   levothyroxine (SYNTHROID) 175 MCG tablet TAKE ONE TABLET BY MOUTH DAILY EXCEPT ON Saturday TAKE ONE AND ONE-HALF TABLET AS DIRECTED Yes AILYN Panda - CNP   hydrocortisone 2.5 % ointment Apply topically 2 times daily. Yes Nolvia Smallwood MD   Multiple Vitamins-Minerals (MULTIVITAMIN PO) Take by mouth Yes Historical Provider, MD   denosumab (PROLIA) 60 MG/ML SOLN SC injection Inject 60 mg into the skin once Every 6 months Yes Historical Provider, MD   vitamin D (CHOLECALCIFEROL) 1000 UNIT TABS tablet Take 1,000 Units by mouth daily Yes Historical Provider, MD   ibuprofen (ADVIL;MOTRIN) 200 MG tablet Take 200 mg by mouth every 6 hours as needed for Pain. Yes Historical Provider, MD   calcium carbonate (OSCAL) 500 MG TABS tablet Take 500 mg by mouth 2 times daily. Yes Historical Provider, MD   vitamin E 400 UNIT capsule Take 400 Units by mouth daily. Yes Historical Provider, MD   aspirin 81 MG tablet Take 81 mg by mouth daily. Yes Historical Provider, MD       Data Review  CBC:   Lab Results   Component Value Date    WBC 4.3 12/05/2016    RBC 4.62 12/05/2016    RBC 4.18 10/13/2011     BMP:   Lab Results   Component Value Date    GLUCOSE 94 11/20/2018    GLUCOSE 84 10/13/2011    CO2 26 11/20/2018    BUN 10 11/20/2018    CREATININE 0.64 11/20/2018    CALCIUM 8.7 11/20/2018              Assessment/Plan:     1. Coronary artery disease involving native coronary artery of native heart without angina pectoris  Continue aspirin and statin     2. Hypercholesteremia  Continue lipitor     3. Bilateral hearing loss, unspecified hearing loss type  Stable, f/u ENT     4. Hiatal hernia  Continue prilosec, no additional intervention at this time     5. Acquired hypothyroidism  Continue levothyroxine     6. Age-related osteoporosis without current pathological fracture  Continue prolia, calcium and vit D supplementation           Electronically signed by Cali Iyer MD on 5/24/2019 at 1:28 PM

## 2019-05-24 NOTE — PROGRESS NOTES
Patient is present for 4 month f/u  No concerns at this time    Pharmacy and medication reviewed with patient    Visit Information    Have you changed or started any medications since your last visit including any over-the-counter medicines, vitamins, or herbal medicines? no   Have you stopped taking any of your medications? Is so, why? -  no  Are you having any side effects from any of your medications? - no    Have you seen any other physician or provider since your last visit? yes - podiatry, cardio   Have you had any other diagnostic tests since your last visit?  no   Have you been seen in the emergency room and/or had an admission in a hospital since we last saw you?  no   Have you had your routine dental cleaning in the past 6 months?  yes -      Do you have an active MyChart account? If no, what is the barrier?   Yes    Patient Care Team:  Kyle Chu MD as PCP - General (Internal Medicine)  Kyle Chu MD as PCP - S Attributed Provider  Judy Mcmullen MD (Endocrinology)  Gretchen Brown (Obstetrics & Gynecology)  Mikie Davey MD as Consulting Physician (Interventional Cardiology)    Medical History Review  Past Medical, Family, and Social History reviewed and does contribute to the patient presenting condition    Health Maintenance   Topic Date Due    Breast cancer screen  05/10/2018    Shingles Vaccine (2 of 3) 06/19/2019 (Originally 3/25/2014)    TSH testing  11/20/2019    Lipid screen  11/20/2023    Colon cancer screen colonoscopy  11/07/2024    DTaP/Tdap/Td vaccine (2 - Td) 01/19/2025    DEXA (modify frequency per FRAX score)  Completed    Flu vaccine  Completed    Pneumococcal 65+ years Vaccine  Completed    Hepatitis C screen  Completed

## 2019-06-07 NOTE — TELEPHONE ENCOUNTER
Pt [facility]-Atorvastin is only med that goes to [facility]    Last visit: 5/24/19  Last Med refill: 4/5/19  Does patient have enough medication for 72 hours: Yes    Next Visit Date:  Future Appointments   Date Time Provider Michael Livingston   12/3/2019  1:00 PM Ludy Whiting  [address] Maintenance   Topic Date Due    Breast cancer screen  05/10/2018    Shingles Vaccine (2 of 3) 06/19/2019 (Originally 3/25/2014)    TSH testing  11/20/2019    Lipid screen  11/20/2023    Colon cancer screen colonoscopy  11/07/2024    DTaP/Tdap/Td vaccine (2 - Td) 01/19/2025    DEXA (modify frequency per FRAX score)  Completed    Flu vaccine  Completed    Pneumococcal 65+ years Vaccine  Completed    Hepatitis C screen  Completed       No results found for: LABA1C          ( goal A1C is < 7)   No results found for: LABMICR  LDL Cholesterol (mg/dL)   Date Value   11/20/2018 52   08/02/2018 169 (H)       (goal LDL is <100)   AST (U/L)   Date Value   11/20/2018 20     ALT (U/L)   Date Value   11/20/2018 22     BUN (mg/dL)   Date Value   11/20/2018 10     BP Readings from Last 3 Encounters:   05/24/19 122/70   01/25/19 114/80   10/19/18 116/74          (goal 120/80)    All Future Testing planned in [facility]  Lab Frequency Next Occurrence   US Renal Kidney Once 10/03/2019               Patient Active Problem List:     Coronary artery disease involving native coronary artery of native heart without angina pectoris     Hypercholesteremia     Hearing loss     Hiatal hernia     Acquired hypothyroidism     Age-related osteoporosis without current pathological fracture

## 2019-06-11 RX ORDER — OMEPRAZOLE 20 MG/1
CAPSULE, DELAYED RELEASE ORAL
Qty: 90 CAPSULE | Refills: 1 | Status: SHIPPED | OUTPATIENT
Start: 2019-06-11 | End: 2020-01-15

## 2019-06-11 RX ORDER — VENLAFAXINE 37.5 MG/1
TABLET ORAL
Qty: 180 TABLET | Refills: 1 | Status: SHIPPED | OUTPATIENT
Start: 2019-06-11 | End: 2020-01-15

## 2019-07-17 ENCOUNTER — TELEPHONE (OUTPATIENT)
Dept: UROLOGY | Age: 69
End: 2019-07-17

## 2019-07-17 DIAGNOSIS — N28.1 RENAL CYST, ACQUIRED: Primary | ICD-10-CM

## 2019-07-31 ENCOUNTER — HOSPITAL ENCOUNTER (OUTPATIENT)
Dept: ULTRASOUND IMAGING | Age: 69
Discharge: HOME | End: 2019-08-02
Payer: MEDICARE

## 2019-07-31 DIAGNOSIS — N28.1 RENAL CYST, ACQUIRED: ICD-10-CM

## 2019-07-31 PROCEDURE — 76775 US EXAM ABDO BACK WALL LIM: CPT

## 2019-08-08 ENCOUNTER — OFFICE VISIT (OUTPATIENT)
Dept: UROLOGY | Age: 69
End: 2019-08-08
Payer: MEDICARE

## 2019-08-08 VITALS
DIASTOLIC BLOOD PRESSURE: 90 MMHG | BODY MASS INDEX: 31.99 KG/M2 | HEART RATE: 56 BPM | HEIGHT: 65 IN | TEMPERATURE: 97.6 F | SYSTOLIC BLOOD PRESSURE: 155 MMHG | WEIGHT: 192 LBS

## 2019-08-08 DIAGNOSIS — R31.29 OTHER MICROSCOPIC HEMATURIA: ICD-10-CM

## 2019-08-08 DIAGNOSIS — N28.1 RENAL CYSTS, ACQUIRED, BILATERAL: Primary | ICD-10-CM

## 2019-08-08 PROCEDURE — 99213 OFFICE O/P EST LOW 20 MIN: CPT | Performed by: UROLOGY

## 2019-08-08 ASSESSMENT — ENCOUNTER SYMPTOMS
DIARRHEA: 0
SHORTNESS OF BREATH: 0
VOMITING: 0
COUGH: 0
NAUSEA: 0
ABDOMINAL PAIN: 0
CONSTIPATION: 0
BACK PAIN: 0
EYE REDNESS: 0
WHEEZING: 0
EYE PAIN: 0

## 2019-08-08 NOTE — PROGRESS NOTES
Take 1,000 Units by mouth daily      ibuprofen (ADVIL;MOTRIN) 200 MG tablet Take 200 mg by mouth every 6 hours as needed for Pain.  calcium carbonate (OSCAL) 500 MG TABS tablet Take 500 mg by mouth 2 times daily.  vitamin E 400 UNIT capsule Take 400 Units by mouth daily.  aspirin 81 MG tablet Take 81 mg by mouth daily. (All medications reviewed and updated by provider sincelast office visit or hospitalization)   Patient has no known allergies. Social History     Tobacco Use   Smoking Status Never Smoker   Smokeless Tobacco Never Used      (If patient a smoker, smoking cessation counseling offered)     Social History     Substance and Sexual Activity   Alcohol Use No       REVIEW OF SYSTEMS:  Review of Systems      Physical Exam:      Vitals:    08/08/19 1112   BP: (!) 155/90   Pulse: 56   Temp: 97.6 °F (36.4 °C)     Body mass index is 31.95 kg/m². Patient is a 76 y.o. female in noacute distress and alert and oriented to person, place and time. Physical Exam  Constitutional: Patient in no acute distress. Neuro: Alert andoriented to person, place and time. Psych: Mood normal, affect normal  Skin: No rash noted  HEENT: Head: Normocephalic and atraumatic  Conjunctivae and EOM are normal. Pupils are equal, round  Nose: Normal  Right External Ear: Normal; Left External Ear: Normal  Mouth: Mucosa Moist  Neck: Supple  Lungs: Respiratory effort is normal  Cardiovascular: Warm & Pink  Abdomen: Soft, non-tender, non-distended with no CVA,  No flank tenderness,  Or hepatosplenomegaly   Lymphatics: No palpable lymphadenopathy. Bladder non-tender and not distended. Musculoskeletal: Normalgait and station      Assessment and Plan      1. Renal cysts, acquired, bilateral    2. Other microscopic hematuria           Plan:    us shows no concerns  F/u prn  Return if symptoms worsen or fail to improve, for Follow up.     Prescriptions Ordered:  No orders of the defined types were placed in this

## 2019-08-09 RX ORDER — ATORVASTATIN CALCIUM 40 MG/1
TABLET, FILM COATED ORAL
Qty: 90 TABLET | Refills: 1 | Status: SHIPPED | OUTPATIENT
Start: 2019-08-09 | End: 2020-01-15

## 2019-09-30 RX ORDER — METOPROLOL SUCCINATE 50 MG/1
TABLET, EXTENDED RELEASE ORAL
Qty: 90 TABLET | Refills: 4 | Status: SHIPPED | OUTPATIENT
Start: 2019-09-30 | End: 2020-11-25

## 2019-12-11 ENCOUNTER — OFFICE VISIT (OUTPATIENT)
Dept: FAMILY MEDICINE CLINIC | Age: 69
End: 2019-12-11
Payer: MEDICARE

## 2019-12-11 VITALS
OXYGEN SATURATION: 95 % | BODY MASS INDEX: 32.69 KG/M2 | HEART RATE: 65 BPM | WEIGHT: 196.2 LBS | SYSTOLIC BLOOD PRESSURE: 121 MMHG | DIASTOLIC BLOOD PRESSURE: 80 MMHG | HEIGHT: 65 IN

## 2019-12-11 DIAGNOSIS — I25.10 CORONARY ARTERY DISEASE INVOLVING NATIVE CORONARY ARTERY OF NATIVE HEART WITHOUT ANGINA PECTORIS: ICD-10-CM

## 2019-12-11 DIAGNOSIS — I10 ESSENTIAL HYPERTENSION: ICD-10-CM

## 2019-12-11 DIAGNOSIS — E03.9 ACQUIRED HYPOTHYROIDISM: Primary | ICD-10-CM

## 2019-12-11 DIAGNOSIS — Z23 NEED FOR PROPHYLACTIC VACCINATION AND INOCULATION AGAINST VARICELLA: ICD-10-CM

## 2019-12-11 DIAGNOSIS — M85.89 OSTEOPENIA OF MULTIPLE SITES: ICD-10-CM

## 2019-12-11 DIAGNOSIS — E78.00 HYPERCHOLESTEREMIA: ICD-10-CM

## 2019-12-11 DIAGNOSIS — M79.671 RIGHT FOOT PAIN: ICD-10-CM

## 2019-12-11 DIAGNOSIS — M81.0 AGE-RELATED OSTEOPOROSIS WITHOUT CURRENT PATHOLOGICAL FRACTURE: ICD-10-CM

## 2019-12-11 DIAGNOSIS — K44.9 HIATAL HERNIA: ICD-10-CM

## 2019-12-11 PROCEDURE — 99214 OFFICE O/P EST MOD 30 MIN: CPT | Performed by: INTERNAL MEDICINE

## 2020-01-02 NOTE — TELEPHONE ENCOUNTER
From: Elfego Banks  To: Mahesh Hassan MD  Sent: 4/4/2019 3:01 PM EDT  Subject: Prescription Question    Hello,    Please transfer the following prescriptions to [facility]. 1. Omeprazole  2. Venlafaxine    Thank you.       Demian Edwards
Medications pended
No

## 2020-01-13 ENCOUNTER — OFFICE VISIT (OUTPATIENT)
Dept: PODIATRY | Age: 70
End: 2020-01-13
Payer: MEDICARE

## 2020-01-13 VITALS — HEIGHT: 65 IN | BODY MASS INDEX: 31.65 KG/M2 | WEIGHT: 190 LBS

## 2020-01-13 PROCEDURE — 99203 OFFICE O/P NEW LOW 30 MIN: CPT | Performed by: PODIATRIST

## 2020-01-13 PROCEDURE — 20612 ASPIRATE/INJ GANGLION CYST: CPT | Performed by: PODIATRIST

## 2020-01-13 RX ORDER — LIDOCAINE HYDROCHLORIDE AND EPINEPHRINE BITARTRATE 20; .01 MG/ML; MG/ML
1 INJECTION, SOLUTION SUBCUTANEOUS ONCE
Status: COMPLETED | OUTPATIENT
Start: 2020-01-13 | End: 2020-01-13

## 2020-01-13 RX ORDER — BUPIVACAINE HYDROCHLORIDE 5 MG/ML
1 INJECTION, SOLUTION PERINEURAL ONCE
Status: COMPLETED | OUTPATIENT
Start: 2020-01-13 | End: 2020-01-13

## 2020-01-13 RX ORDER — TESTOSTERONE CYPIONATE 200 MG/ML
6 INJECTION INTRAMUSCULAR ONCE
Status: COMPLETED | OUTPATIENT
Start: 2020-01-13 | End: 2020-01-13

## 2020-01-13 RX ADMIN — LIDOCAINE HYDROCHLORIDE AND EPINEPHRINE BITARTRATE 1 ML: 20; .01 INJECTION, SOLUTION SUBCUTANEOUS at 14:42

## 2020-01-13 RX ADMIN — BUPIVACAINE HYDROCHLORIDE 5 MG: 5 INJECTION, SOLUTION PERINEURAL at 14:40

## 2020-01-13 RX ADMIN — TESTOSTERONE CYPIONATE 6 MG: 200 INJECTION INTRAMUSCULAR at 14:41

## 2020-01-13 ASSESSMENT — ENCOUNTER SYMPTOMS
DIARRHEA: 0
SHORTNESS OF BREATH: 0
NAUSEA: 0
COLOR CHANGE: 0
BACK PAIN: 0

## 2020-01-13 NOTE — PROGRESS NOTES
Santhosh Bazan, APRN - CNP   venlafaxine (EFFEXOR) 37.5 MG tablet TAKE 1 TABLET TWICE A DAY 6/11/19  Yes Ludy García MD   omeprazole (PRILOSEC) 20 MG delayed release capsule TAKE 1 CAPSULE EVERY MORNING BEFORE BREAKFAST 6/11/19  Yes Ludy García MD   nystatin [facility]) 789058 UNIT/GM powder APPLY TO AFFECTED AREA THREE TIMES A DAY 5/7/19  Yes Ludy García MD   levothyroxine (SYNTHROID) 175 MCG tablet TAKE ONE TABLET BY MOUTH DAILY EXCEPT ON Saturday TAKE ONE AND ONE-HALF TABLET AS DIRECTED 7/19/18  Yes AILYN Panda - CNP   hydrocortisone 2.5 % ointment Apply topically 2 times daily. 6/19/18  Yes Ludy García MD   Multiple Vitamins-Minerals (MULTIVITAMIN PO) Take by mouth   Yes Historical Provider, MD   denosumab (PROLIA) 60 MG/ML SOLN SC injection Inject 60 mg into the skin once Every 6 months   Yes Historical Provider, MD   vitamin D (CHOLECALCIFEROL) 1000 UNIT TABS tablet Take 1,000 Units by mouth daily   Yes Historical Provider, MD   ibuprofen (ADVIL;MOTRIN) 200 MG tablet Take 200 mg by mouth every 6 hours as needed for Pain. Yes Historical Provider, MD   calcium carbonate (OSCAL) 500 MG TABS tablet Take 500 mg by mouth 2 times daily. Yes Historical Provider, MD   vitamin E 400 UNIT capsule Take 400 Units by mouth daily. Yes Historical Provider, MD   aspirin 81 MG tablet Take 81 mg by mouth daily.      Yes Historical Provider, MD       Past Surgical History:   Procedure Laterality Date    BREAST LUMPECTOMY  11/2009    right    CATARACT REMOVAL      COLONOSCOPY  08/26/2008    EYE SURGERY      retinal tear and detachment    EYE SURGERY      cataracts removed,torn retinal both sides,detached retina    HIP SURGERY Left 4/11/16    CRPP with spinal anesthesia    THYROIDECTOMY  3/2006       Family History   Problem Relation Age of Onset    Alzheimer's Disease Mother     Alzheimer's Disease Father     Cancer Sister         breast cancer    Arthritis Sister     Alcohol Abuse Sister        Social History     Tobacco Use    Smoking status: Never Smoker    Smokeless tobacco: Never Used   Substance Use Topics    Alcohol use: No       Review of Systems   Constitutional: Negative for activity change, appetite change, chills, diaphoresis, fatigue and fever. Respiratory: Negative for shortness of breath. Cardiovascular: Negative for leg swelling. Gastrointestinal: Negative for diarrhea and nausea. Endocrine: Negative for cold intolerance, heat intolerance and polyuria. Musculoskeletal: Positive for arthralgias. Negative for back pain, gait problem, joint swelling and myalgias. Skin: Negative for color change, pallor, rash and wound. Allergic/Immunologic: Negative for environmental allergies and food allergies. Neurological: Negative for dizziness, weakness, light-headedness and numbness. Hematological: Does not bruise/bleed easily. Psychiatric/Behavioral: Negative for behavioral problems, confusion and self-injury. The patient is not nervous/anxious. Vitals: There were no vitals filed for this visit. General: AAO x 3 in NAD. Integument: There are no rashes, ulcers, or breaks in the skin noted to the bilateral lower extremities. There is a subcutaneous nodule noted to the dorsal aspect of the right foot. There is pain with palpation to this area. This area is fluctuant, but the cyst is not freely movable. The cyst does transilluminate. Toenails 1-5 of the right foot do not present with thickness, elongation, discoloration, brittleness, subungual debris. Toenails 1-5 of the left foot do not present with thickness, elongation, discoloration, brittleness, subungual debris. Interdigital maceration absent to web spaces 1-4, Bilateral.     There are no preulcerative lesions noted to the right foot. There are no preulcerative lesions noted to the left foot. The skin to the bilateral feet is not thin and shiny.      The skin to the bilateral feet is  warm, supple, and dry. Vascular: DP pulse of the right foot is  palpable. DP pulse of the left foot is  palpable. PT pulse of the right foot is  palpable. PT pulse of the left foot is  palpable. CFT is less than 3 secs to the digits of the right foot. CFT is less than 3 secs to the digits of the left foot. There is no edema noted to the bilateral foot or ankle. There is no hair growth noted to the digits of the bilateral feet. There are no varicosities noted to the right foot/ankle. There are no varicosities noted to the left foot/ankle. Erythema is absent to the bilateral feet. Neurological: Reflexes are present to the right plantar foot and to the Achilles tendon. Reflexes are present to the left plantar foot and to the Achilles tendon. Epicritic sensation is  intact to the right foot. Epicritic sensation is  intact to the left foot. Musculoskeletal:  Muscle strength is +5/5 to all four muscle groups of the right lower extremity and +5/5 to all four muscle groups of the left lower extremity. There are no areas of subluxation, dislocation, or laxity noted to either lower extremity. Range of motion to the right ankle is  free of pain or grinding. Range of motion to the left ankle is  free of pain or grinding. Range of motion to the right subtalar joint is  free of pain or grinding. Range of motion to the left subtalar joint is  free of pain or grinding. No abnormalities, asymmetries, or misalignments are seen between the extremities. Weightbearing evaluation does not reveal rearfoot eversion, medial prominence of the talar head, loss of the medial longitudinal arch height, and too many toes sign bilaterally. The digits of the right foot are not contracted. The digits of the left foot are not contracted.      There is no prominence noted to the first metatarsal head without abduction of the hallux of the

## 2020-01-15 RX ORDER — NYSTATIN 100000 [USP'U]/G
POWDER TOPICAL
Qty: 45 G | Refills: 1 | Status: SHIPPED | OUTPATIENT
Start: 2020-01-15 | End: 2020-04-08

## 2020-01-15 RX ORDER — VENLAFAXINE 37.5 MG/1
TABLET ORAL
Qty: 180 TABLET | Refills: 1 | Status: SHIPPED | OUTPATIENT
Start: 2020-01-15 | End: 2020-06-26

## 2020-01-15 RX ORDER — OMEPRAZOLE 20 MG/1
CAPSULE, DELAYED RELEASE ORAL
Qty: 90 CAPSULE | Refills: 1 | Status: SHIPPED | OUTPATIENT
Start: 2020-01-15 | End: 2020-06-26

## 2020-01-15 RX ORDER — ATORVASTATIN CALCIUM 40 MG/1
TABLET, FILM COATED ORAL
Qty: 30 TABLET | Refills: 0 | Status: SHIPPED | OUTPATIENT
Start: 2020-01-15 | End: 2020-02-10

## 2020-01-27 ENCOUNTER — OFFICE VISIT (OUTPATIENT)
Dept: PODIATRY | Age: 70
End: 2020-01-27
Payer: MEDICARE

## 2020-01-27 VITALS — HEIGHT: 65 IN | BODY MASS INDEX: 31.65 KG/M2 | WEIGHT: 190 LBS

## 2020-01-27 PROCEDURE — 99213 OFFICE O/P EST LOW 20 MIN: CPT | Performed by: PODIATRIST

## 2020-01-27 ASSESSMENT — ENCOUNTER SYMPTOMS
SHORTNESS OF BREATH: 0
NAUSEA: 0
BACK PAIN: 0
DIARRHEA: 0
COLOR CHANGE: 0

## 2020-02-10 RX ORDER — ATORVASTATIN CALCIUM 40 MG/1
TABLET, FILM COATED ORAL
Qty: 90 TABLET | Refills: 1 | Status: SHIPPED | OUTPATIENT
Start: 2020-02-10 | End: 2020-07-15

## 2020-02-10 NOTE — TELEPHONE ENCOUNTER
Last visit: 12/11/19  Last Med refill: 1/15/2020  Does patient have enough medication for 72 hours: No:     Next Visit Date:  Future Appointments   Date Time Provider Michael Yara   6/10/2020  1:00 PM Arti Katrina Epley,  [address] Maintenance   Topic Date Due    Breast cancer screen  05/10/2017    Annual Wellness Visit (AWV)  05/29/2019    Lipid screen  11/20/2019    TSH testing  11/20/2019    Potassium monitoring  11/20/2019    Creatinine monitoring  11/20/2019    Shingles Vaccine (3 of 3) 02/22/2020    Colon cancer screen colonoscopy  11/07/2024    DTaP/Tdap/Td vaccine (2 - Td) 01/19/2025    DEXA (modify frequency per FRAX score)  Completed    Flu vaccine  Completed    Pneumococcal 65+ years Vaccine  Completed    Hepatitis C screen  Completed    Hepatitis A vaccine  Aged Out    Hepatitis B vaccine  Aged Out    Hib vaccine  Aged Out    Meningococcal (ACWY) vaccine  Aged Out       No results found for: LABA1C          ( goal A1C is < 7)   No results found for: LABMICR  LDL Cholesterol (mg/dL)   Date Value   11/20/2018 52   08/02/2018 169 (H)       (goal LDL is <100)   AST (U/L)   Date Value   11/20/2018 20     ALT (U/L)   Date Value   11/20/2018 22     BUN (mg/dL)   Date Value   11/20/2018 10     BP Readings from Last 3 Encounters:   12/11/19 121/80   08/08/19 (!) 155/90   05/24/19 122/70          (goal 120/80)    All Future Testing planned in [facility]  Lab Frequency Next Occurrence   Lipid, Fasting Once 01/10/2020   TSH With Reflex Ft4 Once 01/19/2020   Comprehensive Metabolic Panel Once 77/87/1426               Patient Active Problem List:     Coronary artery disease involving native coronary artery of native heart without angina pectoris     Hypercholesteremia     Hearing loss     Hiatal hernia     Acquired hypothyroidism     Age-related osteoporosis without current pathological fracture     Essential hypertension     Right foot pain     Osteopenia of multiple sites

## 2020-02-27 ENCOUNTER — HOSPITAL ENCOUNTER (OUTPATIENT)
Age: 70
Setting detail: SPECIMEN
Discharge: HOME | End: 2020-02-27
Payer: MEDICARE

## 2020-02-27 ENCOUNTER — OFFICE VISIT (OUTPATIENT)
Dept: FAMILY MEDICINE CLINIC | Age: 70
End: 2020-02-27
Payer: MEDICARE

## 2020-02-27 VITALS
HEART RATE: 71 BPM | DIASTOLIC BLOOD PRESSURE: 82 MMHG | OXYGEN SATURATION: 97 % | BODY MASS INDEX: 33.19 KG/M2 | SYSTOLIC BLOOD PRESSURE: 120 MMHG | HEIGHT: 65 IN | WEIGHT: 199.2 LBS

## 2020-02-27 LAB
-: ABNORMAL
AMORPHOUS: ABNORMAL
BACTERIA: ABNORMAL
BILIRUBIN URINE: ABNORMAL
BILIRUBIN, POC: ABNORMAL
BLOOD URINE, POC: ABNORMAL
CASTS UA: ABNORMAL /LPF (ref 0–8)
CLARITY, POC: ABNORMAL
COLOR, POC: ABNORMAL
COLOR: ABNORMAL
CRYSTALS, UA: ABNORMAL /HPF
EPITHELIAL CELLS UA: ABNORMAL /HPF (ref 0–5)
GLUCOSE URINE, POC: NEGATIVE
GLUCOSE URINE: NEGATIVE
KETONES, POC: ABNORMAL
KETONES, URINE: NEGATIVE
LEUKOCYTE EST, POC: NEGATIVE
LEUKOCYTE ESTERASE, URINE: ABNORMAL
MUCUS: ABNORMAL
NITRITE, POC: POSITIVE
NITRITE, URINE: POSITIVE
OTHER OBSERVATIONS UA: ABNORMAL
PH UA: 5 (ref 5–8)
PH, POC: 6.5
PROTEIN UA: ABNORMAL
PROTEIN, POC: 300
RBC UA: ABNORMAL /HPF (ref 0–4)
RENAL EPITHELIAL, UA: ABNORMAL /HPF
SPECIFIC GRAVITY UA: 1.02 (ref 1–1.03)
SPECIFIC GRAVITY, POC: 1.03
TRICHOMONAS: ABNORMAL
TURBIDITY: ABNORMAL
URINE HGB: ABNORMAL
UROBILINOGEN, POC: 1
UROBILINOGEN, URINE: NORMAL
WBC UA: ABNORMAL /HPF (ref 0–5)
YEAST: ABNORMAL

## 2020-02-27 PROCEDURE — 81003 URINALYSIS AUTO W/O SCOPE: CPT | Performed by: INTERNAL MEDICINE

## 2020-02-27 PROCEDURE — 99213 OFFICE O/P EST LOW 20 MIN: CPT | Performed by: INTERNAL MEDICINE

## 2020-02-27 RX ORDER — SULFAMETHOXAZOLE AND TRIMETHOPRIM 800; 160 MG/1; MG/1
1 TABLET ORAL 2 TIMES DAILY
Qty: 14 TABLET | Refills: 0 | Status: SHIPPED | OUTPATIENT
Start: 2020-02-27 | End: 2020-03-05

## 2020-02-27 ASSESSMENT — ENCOUNTER SYMPTOMS
NAUSEA: 0
VOMITING: 0

## 2020-02-27 NOTE — PROGRESS NOTES
[facility]  [facility] [address]  [address] [place] [id number]  Dept: [phone]  Dept Fax: [phone]      Myra Sorensen is a 71 y.o. female who presents today for hermedical conditions/complaints as noted below. Myra Sorensen is c/o of Urinary Tract Infection            HPI:     Urinary Tract Infection    This is a new problem. The current episode started today. The problem occurs every urination. The problem has been unchanged. The pain is at a severity of 0/10. The patient is experiencing no pain. There has been no fever. Associated symptoms include hematuria and urgency. Pertinent negatives include no chills, discharge, flank pain, frequency, hesitancy, nausea, possible pregnancy, sweats or vomiting. She has tried increased fluids for the symptoms. The treatment provided no relief.        No results found for: LABA1C          ( goal A1Cis < 7)   No results found for: LABMICR  LDL Cholesterol (mg/dL)   Date Value   11/20/2018 52   08/02/2018 169 (H)   01/26/2017 61       (goal LDL is <100)   AST (U/L)   Date Value   11/20/2018 20     ALT (U/L)   Date Value   11/20/2018 22     BUN (mg/dL)   Date Value   11/20/2018 10     BP Readings from Last 3 Encounters:   02/27/20 120/82   12/11/19 121/80   08/08/19 (!) 155/90          (goal 120/80)    Past Medical History:   Diagnosis Date    Breast cancer ([address]     rt.side    CAD (coronary artery disease) 2006    cath EF 65%; small D1 90%    Ductal carcinoma in situ of right breast 11/2009    r breast lumpectomy    GERD (gastroesophageal reflux disease)     Hearing loss     Hiatal hernia 10/23/2012    type IV    Hiatal hernia with gastroesophageal reflux     Hypercholesteremia     Papillary thyroid carcinoma ([address] 2/2006    thyroidectomy    Proximal humerus fracture 3/4/2014      Past Surgical History:   Procedure Laterality Date    BREAST LUMPECTOMY  11/2009    right    CATARACT REMOVAL      COLONOSCOPY  08/26/2008    EYE SURGERY      retinal tear and detachment    EYE SURGERY      cataracts removed,torn retinal both sides,detached retina    HIP SURGERY Left 4/11/16    CRPP with spinal anesthesia    THYROIDECTOMY  3/2006       Family History   Problem Relation Age of Onset    Alzheimer's Disease Mother     Alzheimer's Disease Father     Cancer Sister         breast cancer    Arthritis Sister     Alcohol Abuse Sister        Social History     Tobacco Use    Smoking status: Never Smoker    Smokeless tobacco: Never Used   Substance Use Topics    Alcohol use: No      Current Outpatient Medications   Medication Sig Dispense Refill    atorvastatin (LIPITOR) 40 MG tablet TAKE 1 TABLET DAILY 90 tablet 1    nystatin (NYAMYC) 439171 UNIT/GM powder APPLY TO THE AFFECTED AREA THREE TIMES A DAY 45 g 1    venlafaxine (EFFEXOR) 37.5 MG tablet TAKE 1 TABLET TWICE A  tablet 1    omeprazole (PRILOSEC) 20 MG delayed release capsule TAKE 1 CAPSULE EVERY MORNING BEFORE BREAKFAST 90 capsule 1    metoprolol succinate (TOPROL XL) 50 MG extended release tablet TAKE 1 TABLET DAILY 90 tablet 4    levothyroxine (SYNTHROID) 175 MCG tablet TAKE ONE TABLET BY MOUTH DAILY EXCEPT ON Saturday TAKE ONE AND ONE-HALF TABLET AS DIRECTED 30 tablet 0    hydrocortisone 2.5 % ointment Apply topically 2 times daily. 100 g 1    Multiple Vitamins-Minerals (MULTIVITAMIN PO) Take by mouth      denosumab (PROLIA) 60 MG/ML SOLN SC injection Inject 60 mg into the skin once Every 6 months      vitamin D (CHOLECALCIFEROL) 1000 UNIT TABS tablet Take 1,000 Units by mouth daily      ibuprofen (ADVIL;MOTRIN) 200 MG tablet Take 200 mg by mouth every 6 hours as needed for Pain.  calcium carbonate (OSCAL) 500 MG TABS tablet Take 500 mg by mouth 2 times daily.  vitamin E 400 UNIT capsule Take 400 Units by mouth daily.  aspirin 81 MG tablet Take 81 mg by mouth daily. No current facility-administered medications for this visit.       No Known Allergies    Health Maintenance   Topic Date Due    Breast cancer screen  05/10/2017    Annual Wellness Visit (AWV)  05/29/2019    Lipid screen  11/20/2019    TSH testing  11/20/2019    Potassium monitoring  11/20/2019    Creatinine monitoring  11/20/2019    Shingles Vaccine (3 of 3) 02/22/2020    Colon cancer screen colonoscopy  11/07/2024    DTaP/Tdap/Td vaccine (2 - Td) 01/19/2025    DEXA (modify frequency per FRAX score)  Completed    Flu vaccine  Completed    Pneumococcal 65+ years Vaccine  Completed    Hepatitis C screen  Completed    Hepatitis A vaccine  Aged Out    Hepatitis B vaccine  Aged Out    Hib vaccine  Aged Out    Meningococcal (ACWY) vaccine  Aged Out          Review of Systems   Constitutional: Negative for chills. Gastrointestinal: Negative for nausea and vomiting. Genitourinary: Positive for hematuria and urgency. Negative for flank pain, frequency and hesitancy. All other systems reviewed and are negative. Objective:     /82 (Site: Left Upper Arm, Position: Sitting, Cuff Size: Medium Adult)   Pulse 71   Ht 5' 5\" (1.651 m)   Wt 199 lb 3.2 oz (90.4 kg)   SpO2 97%   BMI 33.15 kg/m²   Physical Exam  Vitals signs and nursing note reviewed. Constitutional:       Appearance: She is well-developed. Cardiovascular:      Rate and Rhythm: Normal rate and regular rhythm. Heart sounds: Normal heart sounds. No murmur. No friction rub. No gallop. Pulmonary:      Effort: Pulmonary effort is normal. No respiratory distress. Breath sounds: Normal breath sounds. No wheezing. Abdominal:      General: Bowel sounds are normal.      Palpations: Abdomen is soft. There is no mass. Tenderness: There is no abdominal tenderness. There is no guarding. Musculoskeletal: Normal range of motion. Neurological:      Mental Status: She is alert. Urine is tea-colored grossly    Assessment/Plan:     1.  Gross hematuria  - POCT Urinalysis No Micro (Auto)  - Urine Culture; Future  - Urinalysis With Microscopic; Future  - sulfamethoxazole-trimethoprim (BACTRIM DS) 800-160 MG per tablet; Take 1 tablet by mouth 2 times daily for 7 days  Dispense: 14 tablet; Refill: 0  - pt to return for fasting labs and urine on Tuesday 3/3/2020  - normal renal and bladder US 7/2019     2. Encounter for screening mammogram for breast cancer  - [facility] Digital Screen Bilateral [[id number]]; Future        No follow-ups on file. Patient given educational materials- see patient instructions. Discussed use, benefit, and side effects of prescribedmedications. All patient questions answered. Pt voiced understanding. Reviewedhealth maintenance. Instructed to continue current medications, diet and exercise. Patient agreed with treatment plan. Follow up as directed.      Electronically signedby Anabella Lawrence MD on 2/27/2020

## 2020-02-29 LAB
CULTURE: ABNORMAL
Lab: ABNORMAL
SPECIMEN DESCRIPTION: ABNORMAL

## 2020-03-02 ENCOUNTER — HOSPITAL ENCOUNTER (OUTPATIENT)
Age: 70
Setting detail: SPECIMEN
Discharge: HOME | End: 2020-03-02
Payer: MEDICARE

## 2020-03-02 LAB
ALBUMIN SERPL-MCNC: 4.6 G/DL (ref 3.5–5.2)
ALBUMIN/GLOBULIN RATIO: 1.4 (ref 1–2.5)
ALP BLD-CCNC: 69 U/L (ref 35–104)
ALT SERPL-CCNC: 32 U/L (ref 5–33)
ANION GAP SERPL CALCULATED.3IONS-SCNC: 19 MMOL/L (ref 9–17)
AST SERPL-CCNC: 25 U/L
BILIRUB SERPL-MCNC: 0.49 MG/DL (ref 0.3–1.2)
BUN BLDV-MCNC: 13 MG/DL (ref 8–23)
BUN/CREAT BLD: ABNORMAL (ref 9–20)
CALCIUM SERPL-MCNC: 9.4 MG/DL (ref 8.6–10.4)
CHLORIDE BLD-SCNC: 100 MMOL/L (ref 98–107)
CHOLESTEROL, FASTING: 171 MG/DL
CHOLESTEROL/HDL RATIO: 2.8
CO2: 23 MMOL/L (ref 20–31)
CREAT SERPL-MCNC: 0.86 MG/DL (ref 0.5–0.9)
GFR AFRICAN AMERICAN: >60 ML/MIN
GFR NON-AFRICAN AMERICAN: >60 ML/MIN
GFR SERPL CREATININE-BSD FRML MDRD: ABNORMAL ML/MIN/{1.73_M2}
GFR SERPL CREATININE-BSD FRML MDRD: ABNORMAL ML/MIN/{1.73_M2}
GLUCOSE BLD-MCNC: 94 MG/DL (ref 70–99)
HDLC SERPL-MCNC: 62 MG/DL
LDL CHOLESTEROL: 72 MG/DL (ref 0–130)
POTASSIUM SERPL-SCNC: 4.5 MMOL/L (ref 3.7–5.3)
SODIUM BLD-SCNC: 142 MMOL/L (ref 135–144)
TOTAL PROTEIN: 7.8 G/DL (ref 6.4–8.3)
TRIGLYCERIDE, FASTING: 187 MG/DL
TSH SERPL DL<=0.05 MIU/L-ACNC: 0.4 MIU/L (ref 0.3–5)
VLDLC SERPL CALC-MCNC: ABNORMAL MG/DL (ref 1–30)

## 2020-03-19 ENCOUNTER — HOSPITAL ENCOUNTER (OUTPATIENT)
Age: 70
Setting detail: SPECIMEN
Discharge: HOME | End: 2020-03-19
Payer: MEDICARE

## 2020-03-20 LAB
CULTURE: NORMAL
Lab: NORMAL
SPECIMEN DESCRIPTION: NORMAL

## 2020-04-01 ENCOUNTER — PATIENT MESSAGE (OUTPATIENT)
Dept: FAMILY MEDICINE CLINIC | Age: 70
End: 2020-04-01

## 2020-04-02 ENCOUNTER — TELEPHONE (OUTPATIENT)
Dept: FAMILY MEDICINE CLINIC | Age: 70
End: 2020-04-02

## 2020-04-02 NOTE — TELEPHONE ENCOUNTER
Called [facility]. [place]oke to [place] - approved. 4/1/20- 9/28/20   Auth code [id number]  [facility]     Please fax information to [facility] and notify patient.

## 2020-04-07 NOTE — TELEPHONE ENCOUNTER
Last visit: 2/27/20  Last Med refill: 2/15/20  Does patient have enough medication for 72 hours: Yes    Next Visit Date:  Future Appointments   Date Time Provider Michael Yara   6/10/2020  1:00 PM Ludy Moser  [address] Maintenance   Topic Date Due    Breast cancer screen  05/10/2017   Steffanie Knotts Annual Wellness Visit (AWV)  05/29/2019    Shingles Vaccine (3 of 3) 02/22/2020    Lipid screen  03/02/2021    TSH testing  03/02/2021    Potassium monitoring  03/02/2021    Creatinine monitoring  04/04/2021    Colon cancer screen colonoscopy  11/07/2024    DTaP/Tdap/Td vaccine (2 - Td) 01/19/2025    DEXA (modify frequency per FRAX score)  Completed    Flu vaccine  Completed    Pneumococcal 65+ years Vaccine  Completed    Hepatitis C screen  Completed    Hepatitis A vaccine  Aged Out    Hepatitis B vaccine  Aged Out    Hib vaccine  Aged Out    Meningococcal (ACWY) vaccine  Aged Out       No results found for: LABA1C          ( goal A1C is < 7)   No results found for: LABMICR  LDL Cholesterol (mg/dL)   Date Value   03/02/2020 72   11/20/2018 52       (goal LDL is <100)   AST (U/L)   Date Value   03/02/2020 25     ALT (U/L)   Date Value   03/02/2020 32     BUN (mg/dL)   Date Value   03/02/2020 13     BP Readings from Last 3 Encounters:   02/27/20 120/82   12/11/19 121/80   08/08/19 (!) 155/90          (goal 120/80)    All Future Testing planned in [facility]  Lab Frequency Next Occurrence   JACQUELINE Digital Screen Bilateral [[id number]] Once 03/28/2020   Urinalysis With Microscopic Once 03/02/2020   CT ABDOMEN W CONTRAST Once 04/07/2020   CT ABDOMEN PELVIS W IV CONTRAST Additional Contrast? Oral Once 04/02/2020               Patient Active Problem List:     Coronary artery disease involving native coronary artery of native heart without angina pectoris     Hypercholesteremia     Hearing loss     Hiatal hernia     Acquired hypothyroidism     Age-related osteoporosis without current pathological fracture     Essential hypertension     Right foot pain     Osteopenia of multiple sites

## 2020-04-08 RX ORDER — NYSTATIN 100000 [USP'U]/G
POWDER TOPICAL
Qty: 45 G | Refills: 23 | Status: SHIPPED | OUTPATIENT
Start: 2020-04-08 | End: 2021-04-23

## 2020-06-10 ENCOUNTER — OFFICE VISIT (OUTPATIENT)
Dept: FAMILY MEDICINE CLINIC | Age: 70
End: 2020-06-10
Payer: MEDICARE

## 2020-06-10 VITALS
BODY MASS INDEX: 31.95 KG/M2 | SYSTOLIC BLOOD PRESSURE: 126 MMHG | HEART RATE: 70 BPM | OXYGEN SATURATION: 96 % | WEIGHT: 192 LBS | DIASTOLIC BLOOD PRESSURE: 82 MMHG | TEMPERATURE: 97.6 F

## 2020-06-10 PROCEDURE — 99214 OFFICE O/P EST MOD 30 MIN: CPT | Performed by: INTERNAL MEDICINE

## 2020-06-10 ASSESSMENT — PATIENT HEALTH QUESTIONNAIRE - PHQ9
SUM OF ALL RESPONSES TO PHQ9 QUESTIONS 1 & 2: 0
1. LITTLE INTEREST OR PLEASURE IN DOING THINGS: 0
SUM OF ALL RESPONSES TO PHQ QUESTIONS 1-9: 0
SUM OF ALL RESPONSES TO PHQ QUESTIONS 1-9: 0
2. FEELING DOWN, DEPRESSED OR HOPELESS: 0

## 2020-06-10 NOTE — PROGRESS NOTES
continues to work well. Mammogram is scheduled at [facility] --> done earlier this week at [address] [place] [address] at [facility] for right foot pain. --> seen Dr Vanessa Black, had an order for MRI for eval foot lesion but it was denied by insurance. She still has the spot on her foot. Not pleased with the service, she would like a referral to a different podiatrist. --> seen Dr Marvin Martino, had a shot that worked to relieve the pain but still has the spot on her legs. Abdominal pain is resolved and hematuria has not reoccured. Patient's medications, allergies, past medical, surgical, social and family histories were reviewed and updated as appropriate. Social History     Tobacco Use    Smoking status: Never Smoker    Smokeless tobacco: Never Used   Substance Use Topics    Alcohol use: No        Review of Systems  Energy level good overall, and weight is stable. No chest pain or shortness of breath. Bowels have been normal without constipation or diarrhea       Objective:          /82 (Site: Left Upper Arm, Position: Sitting, Cuff Size: Medium Adult)   Pulse 70   Temp 97.6 °F (36.4 °C) (Temporal)   Wt 192 lb (87.1 kg)   SpO2 96%   BMI 31.95 kg/m²     General: Alert and oriented, in no distress. Patient ambulating with normal gait. Normal body habitus. Chest: clear with no wheezes or rales. No retractions, or use of accessory muscles noted. Cardiovascular: PMI is not displaced, and no thrill noted. Regular rate and rhythm with no rub, murmur or gallop. There is no peripheral edema. Pedal pulses are normal.   Abdomen: Abdomen is soft and nontender. There is no organomegaly based on exam by palpation. There is no abdominal obesity present. The bowel sounds are normal.   Musculoskeletal: There are no deformities of the the extremities. Patient has all ten fingers intact. The patient has full range of motion on all 4 extremities without pain. Skin: The skin is warm and dry. Prior to Visit Medications    Medication Sig Taking? Authorizing Provider   nystatin ([address]) [id number] UNIT/GM powder APPLY TO THE AFFECTED AREA THREE TIMES A DAY Yes Ludy Nesbitt MD   atorvastatin (LIPITOR) 40 MG tablet TAKE 1 TABLET DAILY Yes Ludy Nesbitt MD   venlafaxine (EFFEXOR) 37.5 MG tablet TAKE 1 TABLET TWICE A DAY Yes Ludy Nesbitt MD   omeprazole (PRILOSEC) 20 MG delayed release capsule TAKE 1 CAPSULE EVERY MORNING BEFORE BREAKFAST Yes Ludy Nesbitt MD   metoprolol succinate (TOPROL XL) 50 MG extended release tablet TAKE 1 TABLET DAILY Yes Ludy Nesbitt MD   levothyroxine (SYNTHROID) 175 MCG tablet TAKE ONE TABLET BY MOUTH DAILY EXCEPT ON Saturday TAKE ONE AND ONE-HALF TABLET AS DIRECTED Yes Bee Troncoso, APRN - CNP   Multiple Vitamins-Minerals (MULTIVITAMIN PO) Take by mouth Yes Historical Provider, MD   denosumab (PROLIA) 60 MG/ML SOLN SC injection Inject 60 mg into the skin once Every 6 months Yes Historical Provider, MD   vitamin D (CHOLECALCIFEROL) 1000 UNIT TABS tablet Take 1,000 Units by mouth daily Yes Historical Provider, MD   ibuprofen (ADVIL;MOTRIN) 200 MG tablet Take 200 mg by mouth every 6 hours as needed for Pain. Yes Historical Provider, MD   calcium carbonate (OSCAL) 500 MG TABS tablet Take 500 mg by mouth 2 times daily. Yes Historical Provider, MD   vitamin E 400 UNIT capsule Take 400 Units by mouth daily. Yes Historical Provider, MD   aspirin 81 MG tablet Take 81 mg by mouth daily. Yes Historical Provider, MD       Data Review  CBC:   Lab Results   Component Value Date    WBC 4.3 12/05/2016    RBC 4.62 12/05/2016    RBC 4.18 10/13/2011     BMP:   Lab Results   Component Value Date    GLUCOSE 94 03/02/2020    GLUCOSE 84 10/13/2011    CO2 23 03/02/2020    BUN 13 03/02/2020    CREATININE 0.86 03/02/2020    CALCIUM 9.4 03/02/2020              Assessment/Plan:     1. Acquired hypothyroidism  Continue Synthroid    2.  Essential hypertension  Continue current regimen. 3. Hypercholesteremia  Continue atorvastatin 40 mg nightly and aspirin 81 mg daily. 4. Coronary artery disease involving native coronary artery of native heart without angina pectoris  Continue aspirin. 5. Age-related osteoporosis without current pathological fracture  Continue Prolia per gynecology, calcium and vitamin D supplementation.           Electronically signed by Azul Spann MD on 6/10/2020 at 1:14 PM

## 2020-06-10 NOTE — PROGRESS NOTES
Pt is here today for a 6 month follow up. She states is doing good at this time. She states no refills needed at this time.

## 2020-06-26 RX ORDER — VENLAFAXINE 37.5 MG/1
TABLET ORAL
Qty: 180 TABLET | Refills: 1 | Status: SHIPPED | OUTPATIENT
Start: 2020-06-26 | End: 2020-12-22 | Stop reason: SDUPTHER

## 2020-06-26 RX ORDER — OMEPRAZOLE 20 MG/1
CAPSULE, DELAYED RELEASE ORAL
Qty: 90 CAPSULE | Refills: 1 | Status: SHIPPED | OUTPATIENT
Start: 2020-06-26 | End: 2020-12-22 | Stop reason: SDUPTHER

## 2020-07-15 RX ORDER — ATORVASTATIN CALCIUM 40 MG/1
TABLET, FILM COATED ORAL
Qty: 90 TABLET | Refills: 1 | Status: SHIPPED | OUTPATIENT
Start: 2020-07-15 | End: 2020-12-22 | Stop reason: SDUPTHER

## 2020-07-15 NOTE — TELEPHONE ENCOUNTER
Last visit: 6/10/20  Last Med refill: 2/10/20  Does patient have enough medication for 72 hours: Yes    Next Visit Date:  No future appointments.     Health Maintenance   Topic Date Due    Breast cancer screen  05/10/2017   Nadiya Alaniz Annual Wellness Visit (AWV)  05/29/2019    Flu vaccine (1) 09/01/2020    Lipid screen  03/02/2021    TSH testing  03/02/2021    Potassium monitoring  03/02/2021    Creatinine monitoring  04/04/2021    Colon cancer screen colonoscopy  11/07/2024    DTaP/Tdap/Td vaccine (2 - Td) 01/19/2025    DEXA (modify frequency per FRAX score)  Completed    Shingles Vaccine  Completed    Pneumococcal 65+ years Vaccine  Completed    Hepatitis C screen  Completed    Hepatitis A vaccine  Aged Out    Hepatitis B vaccine  Aged Out    Hib vaccine  Aged Out    Meningococcal (ACWY) vaccine  Aged Out       No results found for: LABA1C          ( goal A1C is < 7)   No results found for: LABMICR  LDL Cholesterol (mg/dL)   Date Value   03/02/2020 72   11/20/2018 52       (goal LDL is <100)   AST (U/L)   Date Value   03/02/2020 25     ALT (U/L)   Date Value   03/02/2020 32     BUN (mg/dL)   Date Value   03/02/2020 13     BP Readings from Last 3 Encounters:   06/10/20 126/82   02/27/20 120/82   12/11/19 121/80          (goal 120/80)    All Future Testing planned in [facility]  Lab Frequency Next Occurrence   JACQUELINE Digital Screen Bilateral [[id number]] Once 06/07/2020               Patient Active Problem List:     Coronary artery disease involving native coronary artery of native heart without angina pectoris     Hypercholesteremia     Hearing loss     Hiatal hernia     Acquired hypothyroidism     Age-related osteoporosis without current pathological fracture     Essential hypertension     Right foot pain     Osteopenia of multiple sites

## 2020-11-17 ENCOUNTER — OFFICE VISIT (OUTPATIENT)
Dept: UROLOGY | Age: 70
End: 2020-11-17
Payer: MEDICARE

## 2020-11-17 VITALS
HEIGHT: 65 IN | WEIGHT: 195 LBS | TEMPERATURE: 97.5 F | SYSTOLIC BLOOD PRESSURE: 174 MMHG | BODY MASS INDEX: 32.49 KG/M2 | HEART RATE: 70 BPM | DIASTOLIC BLOOD PRESSURE: 104 MMHG

## 2020-11-17 LAB
BILIRUBIN, POC: ABNORMAL
BLOOD URINE, POC: ABNORMAL
CLARITY, POC: CLEAR
COLOR, POC: YELLOW
GLUCOSE URINE, POC: ABNORMAL
KETONES, POC: ABNORMAL
LEUKOCYTE EST, POC: ABNORMAL
NITRITE, POC: ABNORMAL
PH, POC: ABNORMAL
PROTEIN, POC: 15
SPECIFIC GRAVITY, POC: ABNORMAL
UROBILINOGEN, POC: ABNORMAL

## 2020-11-17 PROCEDURE — 81002 URINALYSIS NONAUTO W/O SCOPE: CPT | Performed by: UROLOGY

## 2020-11-17 PROCEDURE — 99214 OFFICE O/P EST MOD 30 MIN: CPT | Performed by: UROLOGY

## 2020-11-17 ASSESSMENT — ENCOUNTER SYMPTOMS
BACK PAIN: 0
VOMITING: 0
EYE REDNESS: 0
CONSTIPATION: 0
NAUSEA: 0
SHORTNESS OF BREATH: 0
DIARRHEA: 0
EYE PAIN: 0
ABDOMINAL PAIN: 0
COUGH: 0
WHEEZING: 0

## 2020-11-17 NOTE — PROGRESS NOTES
Review of Systems   Constitutional: Negative for appetite change, chills and fatigue. Eyes: Negative for pain, redness and visual disturbance. Respiratory: Negative for cough, shortness of breath and wheezing. Cardiovascular: Negative for chest pain and leg swelling. Gastrointestinal: Negative for abdominal pain, constipation, diarrhea, nausea and vomiting. Genitourinary: Positive for difficulty urinating, dysuria, frequency, hematuria and urgency. Negative for flank pain. Musculoskeletal: Negative for back pain, joint swelling and myalgias. Skin: Negative for rash and wound. Neurological: Negative for dizziness, weakness and numbness. Hematological: Does not bruise/bleed easily.

## 2020-11-17 NOTE — PROGRESS NOTES
[address] [address]  Dept: [address] [facility] Urology Office Note - New Patient    Patient:  Yamile Staley  YOB: 1950  Date: 11/17/2020    The patient is a 79 y.o. female who presentstoday for evaluation of the following problems:   Chief Complaint   Patient presents with    Hematuria     NP    referred by Lydia Gandhi MD.    HPI  Here for gross hematuria. This started after a pap exam. She also had symptoms of a uti at that time. She had a ct in past that was neg.    She doesn't usually get uti's, no stones, no smoking hx, she usually urinates with good strema    (Patient's old records have been requested, reviewed and summarized in today's note.)    Summary of old records: N/A    History: N/A    ProceduresToday: N/A    Urinalysis today:  Results for POC orders placed in visit on 11/17/20   POCT Urinalysis no Micro   Result Value Ref Range    Color, UA yellow     Clarity, UA clear     Glucose, UA POC neg     Bilirubin, UA      Ketones, UA      Spec Grav, UA      Blood, UA POC pos     pH, UA      Protein, UA POC 15     Urobilinogen, UA      Leukocytes, UA neg     Nitrite, UA neg        AUA Symptom Score (11/17/2020):  INCOMPLETE EMPTYING: How often have you had the sensation of not emptying your bladder?: Not at all  FREQUENCY: How often do you have to urinate less than every two hours?: Less than 1 to 5 times  INTERMITTENCY: How often have you found you stopped and started again several times when you urinated?: Not at all  URGENCY: How often have you found it difficult to postpone urination?: Not at all  WEAK STREAM: How often have you had a weak urinary stream?: Not at all  STRAINING: How often have you had to strain to start  urination?: Not at all  NOCTURIA: How many times did you typically get up at night to uriniate?: NONE  TOTAL I-PSS SCORE[de-identified] 1       Last BUN andcreatinine:  Lab Results Component Value Date    BUN 13 03/02/2020     Lab Results   Component Value Date    CREATININE 0.86 03/02/2020       Additional Lab/Culture results: none    Reviewed during this Office Visit: none  (results were independently reviewed byphysician and radiology report verified)    PAST MEDICAL, FAMILY AND SOCIAL HISTORY:  Past Medical History:   Diagnosis Date    Breast cancer ([facility] [address]     rt.side    CAD (coronary artery disease) 2006    cath EF 65%; small D1 90%    Ductal carcinoma in situ of right breast 11/2009    r breast lumpectomy    GERD (gastroesophageal reflux disease)     Hearing loss     Hiatal hernia 10/23/2012    type IV    Hiatal hernia with gastroesophageal reflux     Hypercholesteremia     Papillary thyroid carcinoma ([facility] [address] 2/2006    thyroidectomy    Proximal humerus fracture 3/4/2014     Past Surgical History:   Procedure Laterality Date    BREAST LUMPECTOMY  11/2009    right    CATARACT REMOVAL      COLONOSCOPY  08/26/2008    EYE SURGERY      retinal tear and detachment    EYE SURGERY      cataracts removed,torn retinal both sides,detached retina    HIP SURGERY Left 4/11/16    CRPP with spinal anesthesia    THYROIDECTOMY  3/2006     Family History   Problem Relation Age of Onset    Alzheimer's Disease Mother     Alzheimer's Disease Father     Cancer Sister         breast cancer    Arthritis Sister     Alcohol Abuse Sister      Outpatient Medications Marked as Taking for the 11/17/20 encounter (Office Visit) with Brenton Batista MD   Medication Sig Dispense Refill    atorvastatin (LIPITOR) 40 MG tablet TAKE 1 TABLET DAILY 90 tablet 1    venlafaxine (EFFEXOR) 37.5 MG tablet TAKE 1 TABLET TWICE A  tablet 1    omeprazole (PRILOSEC) 20 MG delayed release capsule TAKE 1 CAPSULE EVERY MORNING BEFORE BREAKFAST 90 capsule 1    nystatin (NYAMYC) 151615 UNIT/GM powder APPLY TO THE AFFECTED AREA THREE TIMES A DAY 45 g 23    metoprolol succinate (TOPROL XL) 50 MG extended release tablet TAKE 1 TABLET DAILY 90 tablet 4    levothyroxine (SYNTHROID) 175 MCG tablet TAKE ONE TABLET BY MOUTH DAILY EXCEPT ON Saturday TAKE ONE AND ONE-HALF TABLET AS DIRECTED 30 tablet 0    Multiple Vitamins-Minerals (MULTIVITAMIN PO) Take by mouth      denosumab (PROLIA) 60 MG/ML SOLN SC injection Inject 60 mg into the skin once Every 6 months      vitamin D (CHOLECALCIFEROL) 1000 UNIT TABS tablet Take 1,000 Units by mouth daily      ibuprofen (ADVIL;MOTRIN) 200 MG tablet Take 200 mg by mouth every 6 hours as needed for Pain.  calcium carbonate (OSCAL) 500 MG TABS tablet Take 500 mg by mouth 2 times daily.  vitamin E 400 UNIT capsule Take 400 Units by mouth daily.  aspirin 81 MG tablet Take 81 mg by mouth daily. Patient has no known allergies. Social History     Tobacco Use   Smoking Status Never Smoker   Smokeless Tobacco Never Used      (If patient a smoker, smoking cessation counseling offered)   Social History     Substance and Sexual Activity   Alcohol Use No       REVIEW OF SYSTEMS:  Review of Systems    Physical Exam:    This a 79 y.o. female      Vitals:    11/17/20 1425   BP: (!) 174/104   Pulse:    Temp:      Body mass index is 32.45 kg/m². Physical Exam  Constitutional: Patient in no acute distress, ggod grooming, appropriately dressed  Neuro: Alert and oriented to person, place and time. Psych:Mood normal, affect normal  Skin: No rash noted  HEENT: Head: Normocephalic and atraumatic,Conjunctivae and EOM are normal,Nose- normal, Right/Left External Ear: normal, Mouth: Mucosa Moist  Neck: Supple  Lungs: Respiratory effort is normal  Cardiovascular: strong and regular, no lower leg edema  Abdomen: Soft, non-tender, non-distended with no CVA,    Lymphatics: No cervical palpable lymphadenopathy. Bladder non-tender and not distended. Musculoskeletal: Normal gait and station        Assessment and Plan      1.  Hematuria, unspecified type            Plan:    ct urogram  cysto    Prescriptions Ordered:  No orders of the defined types were placed in this encounter. Orders Placed:  Orders Placed This Encounter   Procedures    CT Urogram     Standing Status:   Future     Standing Expiration Date:   11/17/2021    Basic Metabolic Panel     Standing Status:   Future     Standing Expiration Date:   11/17/2021    POCT Urinalysis no Darlin Park MD    Agree with the ROS entered by the MA.

## 2020-11-19 ENCOUNTER — HOSPITAL ENCOUNTER (OUTPATIENT)
Age: 70
Setting detail: SPECIMEN
Discharge: HOME | End: 2020-11-19
Payer: MEDICARE

## 2020-11-19 LAB
THYROXINE, FREE: 1.6 NG/DL (ref 0.93–1.7)
TSH SERPL DL<=0.05 MIU/L-ACNC: 0.16 MIU/L (ref 0.3–5)

## 2020-11-24 ENCOUNTER — HOSPITAL ENCOUNTER (OUTPATIENT)
Age: 70
Discharge: HOME | End: 2020-11-24
Payer: MEDICARE

## 2020-11-24 ENCOUNTER — HOSPITAL ENCOUNTER (OUTPATIENT)
Dept: CT IMAGING | Age: 70
Discharge: HOME | End: 2020-11-26
Payer: MEDICARE

## 2020-11-24 LAB
ANION GAP SERPL CALCULATED.3IONS-SCNC: 9 MMOL/L (ref 9–17)
BUN BLDV-MCNC: 16 MG/DL (ref 8–23)
BUN/CREAT BLD: ABNORMAL (ref 9–20)
CALCIUM SERPL-MCNC: 9.4 MG/DL (ref 8.6–10.4)
CHLORIDE BLD-SCNC: 99 MMOL/L (ref 98–107)
CO2: 29 MMOL/L (ref 20–31)
CREAT SERPL-MCNC: 0.57 MG/DL (ref 0.5–0.9)
GFR AFRICAN AMERICAN: >60 ML/MIN
GFR NON-AFRICAN AMERICAN: >60 ML/MIN
GFR SERPL CREATININE-BSD FRML MDRD: ABNORMAL ML/MIN/{1.73_M2}
GFR SERPL CREATININE-BSD FRML MDRD: ABNORMAL ML/MIN/{1.73_M2}
GLUCOSE BLD-MCNC: 110 MG/DL (ref 70–99)
POTASSIUM SERPL-SCNC: 4.5 MMOL/L (ref 3.7–5.3)
SODIUM BLD-SCNC: 137 MMOL/L (ref 135–144)

## 2020-11-24 PROCEDURE — 74178 CT ABD&PLV WO CNTR FLWD CNTR: CPT

## 2020-11-24 PROCEDURE — 6360000004 HC RX CONTRAST MEDICATION: Performed by: UROLOGY

## 2020-11-24 PROCEDURE — 80048 BASIC METABOLIC PNL TOTAL CA: CPT

## 2020-11-24 PROCEDURE — 36415 COLL VENOUS BLD VENIPUNCTURE: CPT

## 2020-11-24 PROCEDURE — 2580000003 HC RX 258: Performed by: UROLOGY

## 2020-11-24 RX ORDER — 0.9 % SODIUM CHLORIDE 0.9 %
80 INTRAVENOUS SOLUTION INTRAVENOUS ONCE
Status: COMPLETED | OUTPATIENT
Start: 2020-11-24 | End: 2020-11-24

## 2020-11-24 RX ORDER — SODIUM CHLORIDE 0.9 % (FLUSH) 0.9 %
10 SYRINGE (ML) INJECTION PRN
Status: DISCONTINUED | OUTPATIENT
Start: 2020-11-24 | End: 2020-11-27 | Stop reason: HOSPADM

## 2020-11-24 RX ADMIN — IOPAMIDOL 120 ML: 755 INJECTION, SOLUTION INTRAVENOUS at 14:51

## 2020-11-24 RX ADMIN — Medication 10 ML: at 14:51

## 2020-11-24 RX ADMIN — SODIUM CHLORIDE 80 ML: 9 INJECTION, SOLUTION INTRAVENOUS at 14:51

## 2020-11-25 RX ORDER — METOPROLOL SUCCINATE 50 MG/1
TABLET, EXTENDED RELEASE ORAL
Qty: 90 TABLET | Refills: 0 | Status: SHIPPED | OUTPATIENT
Start: 2020-11-25 | End: 2020-12-24 | Stop reason: SDUPTHER

## 2020-11-25 NOTE — TELEPHONE ENCOUNTER
Last visit: 6/10/20  Last Med refill: 9/30/19  Does patient have enough medication for 72 hours: Yes    PATIENT NEEDS APPT-sent mychart message.     Next Visit Date:  Future Appointments   Date Time Provider Michael Ramiresisti   12/15/2020  1:45 PM Sylvia Munoz MD [address] Maintenance   Topic Date Due    Diabetes screen  11/13/1990    Breast cancer screen  05/10/2017    Annual Wellness Visit (AWV)  05/29/2019    Lipid screen  03/02/2021    Potassium monitoring  03/02/2021    Creatinine monitoring  04/04/2021    TSH testing  11/19/2021    Colon cancer screen colonoscopy  11/07/2024    DTaP/Tdap/Td vaccine (2 - Td) 01/19/2025    DEXA (modify frequency per FRAX score)  Completed    Flu vaccine  Completed    Shingles Vaccine  Completed    Pneumococcal 65+ years Vaccine  Completed    Hepatitis C screen  Completed    Hepatitis A vaccine  Aged Out    Hepatitis B vaccine  Aged Out    Hib vaccine  Aged Out    Meningococcal (ACWY) vaccine  Aged Out       No results found for: LABA1C          ( goal A1C is < 7)   No results found for: LABMICR  LDL Cholesterol (mg/dL)   Date Value   03/02/2020 72   11/20/2018 52       (goal LDL is <100)   AST (U/L)   Date Value   03/02/2020 25     ALT (U/L)   Date Value   03/02/2020 32     BUN (mg/dL)   Date Value   11/24/2020 16     BP Readings from Last 3 Encounters:   11/17/20 (!) 174/104   06/10/20 126/82   02/27/20 120/82          (goal 120/80)    All Future Testing planned in [facility]  Lab Frequency Next Occurrence   JACQUELINE Digital Screen Bilateral [[id number]] Once 11/22/2020               Patient Active Problem List:     Coronary artery disease involving native coronary artery of native heart without angina pectoris     Hypercholesteremia     Hearing loss     Hiatal hernia     Acquired hypothyroidism     Age-related osteoporosis without current pathological fracture     Essential hypertension     Right foot pain     Osteopenia of multiple sites

## 2020-12-15 ENCOUNTER — PROCEDURE VISIT (OUTPATIENT)
Dept: UROLOGY | Age: 70
End: 2020-12-15
Payer: MEDICARE

## 2020-12-15 VITALS — SYSTOLIC BLOOD PRESSURE: 122 MMHG | TEMPERATURE: 98.1 F | HEART RATE: 69 BPM | DIASTOLIC BLOOD PRESSURE: 70 MMHG

## 2020-12-15 PROCEDURE — 52000 CYSTOURETHROSCOPY: CPT | Performed by: UROLOGY

## 2020-12-15 PROCEDURE — 99999 PR OFFICE/OUTPT VISIT,PROCEDURE ONLY: CPT | Performed by: UROLOGY

## 2020-12-15 NOTE — PROGRESS NOTES
Cystoscopy Operative Note (12/15/20):  Surgeon: Sabina Schmidt MD   Anesthesia: Urethral 2% Xylocaine  Indications: Hematuria   Position: Dorsal Lithotomy    Findings:   Risks and Benefits discussed with patient prior to procedure. The patient was prepped and draped in the usual sterile fashion. The flexible cystoscope was advanced through the urethra and into the bladder. The bladder was thoroughly inspected and the following was noted:    Vagina: normal appearing vagina with normal color and discharge, no lesions  Residual Urine: mild  Urethra: not indicated and normal appearing urethra with no masses, tenderness or lesions  Bladder: No tumors or CIS noted. No bladder diverticulum. There was mild trabeculation noted. Ureters: Clear efflux from both ureters. Orifices with normal configuration and location. The cystoscope was removed. The patient tolerated the procedure well. Agree with the ROS entered by the MA.

## 2020-12-22 ENCOUNTER — OFFICE VISIT (OUTPATIENT)
Dept: FAMILY MEDICINE CLINIC | Age: 70
End: 2020-12-22
Payer: MEDICARE

## 2020-12-22 VITALS
OXYGEN SATURATION: 100 % | HEART RATE: 76 BPM | SYSTOLIC BLOOD PRESSURE: 132 MMHG | BODY MASS INDEX: 32.78 KG/M2 | TEMPERATURE: 97.6 F | WEIGHT: 197 LBS | DIASTOLIC BLOOD PRESSURE: 80 MMHG

## 2020-12-22 PROBLEM — K21.9 GASTROESOPHAGEAL REFLUX DISEASE: Status: ACTIVE | Noted: 2020-12-22

## 2020-12-22 PROBLEM — F33.41 RECURRENT MAJOR DEPRESSIVE DISORDER, IN PARTIAL REMISSION (HCC): Status: ACTIVE | Noted: 2020-12-22

## 2020-12-22 PROCEDURE — 99214 OFFICE O/P EST MOD 30 MIN: CPT | Performed by: INTERNAL MEDICINE

## 2020-12-22 RX ORDER — OMEPRAZOLE 20 MG/1
CAPSULE, DELAYED RELEASE ORAL
Qty: 90 CAPSULE | Refills: 1 | Status: SHIPPED | OUTPATIENT
Start: 2020-12-22 | End: 2020-12-28 | Stop reason: SDUPTHER

## 2020-12-22 RX ORDER — VENLAFAXINE 37.5 MG/1
TABLET ORAL
Qty: 180 TABLET | Refills: 1 | Status: SHIPPED | OUTPATIENT
Start: 2020-12-22 | End: 2020-12-28 | Stop reason: SDUPTHER

## 2020-12-22 RX ORDER — CONJUGATED ESTROGENS 0.62 MG/G
0.5 CREAM VAGINAL DAILY
Qty: 1 TUBE | Refills: 0 | Status: SHIPPED | OUTPATIENT
Start: 2020-12-22 | End: 2021-01-05 | Stop reason: SDUPTHER

## 2020-12-22 RX ORDER — ATORVASTATIN CALCIUM 40 MG/1
TABLET, FILM COATED ORAL
Qty: 90 TABLET | Refills: 1 | Status: SHIPPED | OUTPATIENT
Start: 2020-12-22 | End: 2020-12-28 | Stop reason: SDUPTHER

## 2020-12-22 NOTE — PROGRESS NOTES
Subjective:       Patient ID:     Jolene Mcfadden is a 79 y.o. female who presents for   Chief Complaint   Patient presents with    Hypothyroidism       HPI:  Nursing note reviewed and discussed with patient. Here for follow-up. Hypertension-stable on current regimen. Denies chest pain, palpitations, dyspnea on exertion, peripheral edema, orthopnea, paroxysmal nocturnal dyspnea. Hyperlipidemia-stable on Lipitor 40 mg nightly. Denies myalgias, dyspepsia, jaundice. GERD-well-controlled with Prilosec 20 mg daily. Denies heartburn, melena, dysphagia, hematochezia, weight change. Hypothyroidism-remains on Synthroid. Denies heat or cold intolerance, palpitations, weight change, dry skin, constipation. Depression-stable on Effexor. Denies sleep difficulty, thoughts of death, suicidal or homicidal ideation, auditory or visual hallucination, thoughts of self-harm. Remains on calcium and vitamin D supplementation. Remains on Prolia per endocrinology. Taking occasional ibuprofen for joint pains. Mammogram done at [facility]   Following with urology, cardiology and endocrinology     Patient's medications, allergies, past medical, surgical, social and family histories were reviewed and updated as appropriate.     Past Medical History:   Diagnosis Date    Breast cancer ([facility] [address]     rt.side    CAD (coronary artery disease) 2006    cath EF 65%; small D1 90%    Ductal carcinoma in situ of right breast 11/2009    r breast lumpectomy    GERD (gastroesophageal reflux disease)     Hearing loss     Hiatal hernia 10/23/2012    type IV    Hiatal hernia with gastroesophageal reflux     Hypercholesteremia     Papillary thyroid carcinoma ([facility] [address] 2/2006    thyroidectomy    Proximal humerus fracture 3/4/2014     Past Surgical History:   Procedure Laterality Date    BREAST LUMPECTOMY  11/2009    right    CATARACT REMOVAL      COLONOSCOPY  08/26/2008    EYE SURGERY      retinal tear and detachment    EYE SURGERY cataracts removed,torn retinal both sides,detached retina    HIP SURGERY Left 4/11/16    CRPP with spinal anesthesia    THYROIDECTOMY  3/2006       Social History     Tobacco Use    Smoking status: Never Smoker    Smokeless tobacco: Never Used   Substance Use Topics    Alcohol use: No      Patient Active Problem List   Diagnosis    Coronary artery disease involving native coronary artery of native heart without angina pectoris    Hypercholesteremia    Hearing loss    Hiatal hernia    Acquired hypothyroidism    Age-related osteoporosis without current pathological fracture    Essential hypertension    Right foot pain    Osteopenia of multiple sites         Prior to Visit Medications    Medication Sig Taking? Authorizing Provider   metoprolol succinate (TOPROL XL) 50 MG extended release tablet TAKE 1 TABLET DAILY Yes Ludy Birmingham MD   atorvastatin (LIPITOR) 40 MG tablet TAKE 1 TABLET DAILY Yes Ludy Birmingham MD   venlafaxine (EFFEXOR) 37.5 MG tablet TAKE 1 TABLET TWICE A DAY Yes Hortensia Hidalgo MD   omeprazole (PRILOSEC) 20 MG delayed release capsule TAKE 1 CAPSULE EVERY MORNING BEFORE BREAKFAST Yes Ludy Birmingham MD   nystatin ([address]) 543972 UNIT/GM powder APPLY TO THE AFFECTED AREA THREE TIMES A DAY Yes Hortensia Hidalgo MD   levothyroxine (SYNTHROID) 175 MCG tablet TAKE ONE TABLET BY MOUTH DAILY EXCEPT ON Saturday TAKE ONE AND ONE-HALF TABLET AS DIRECTED Yes Bee Troncoso, APRN - CNP   Multiple Vitamins-Minerals (MULTIVITAMIN PO) Take by mouth Yes Historical Provider, MD   denosumab (PROLIA) 60 MG/ML SOLN SC injection Inject 60 mg into the skin once Every 6 months Yes Historical Provider, MD   vitamin D (CHOLECALCIFEROL) 1000 UNIT TABS tablet Take 1,000 Units by mouth daily Yes Historical Provider, MD   ibuprofen (ADVIL;MOTRIN) 200 MG tablet Take 200 mg by mouth every 6 hours as needed for Pain.  Yes Historical Provider, MD calcium carbonate (OSCAL) 500 MG TABS tablet Take 500 mg by mouth 2 times daily. Yes Historical Provider, MD   vitamin E 400 UNIT capsule Take 400 Units by mouth daily. Yes Historical Provider, MD   aspirin 81 MG tablet Take 81 mg by mouth daily. Yes Historical Provider, MD     Review of Systems  Review of Systems   Constitutional: Negative for fatigue, fever and unexpected weight change. Respiratory: Negative for cough, choking, chest tightness, shortness of breath and wheezing. Cardiovascular: Negative for chest pain, palpitations and leg swelling. Gastrointestinal: Negative for abdominal pain, anal bleeding, blood in stool, constipation, diarrhea, nausea and vomiting. Endocrine: Negative. Musculoskeletal: Negative for joint swelling and myalgias. Skin: Negative. Neurological: Negative for dizziness. Psychiatric/Behavioral: Negative for sleep disturbance. All other systems reviewed and are negative. Objective:       Physical Exam:  /80 (Site: Left Upper Arm, Position: Sitting, Cuff Size: Medium Adult)   Pulse 76   Temp 97.6 °F (36.4 °C) (Temporal)   Wt 197 lb (89.4 kg)   SpO2 100%   BMI 32.78 kg/m²   Physical Exam  Vitals signs and nursing note reviewed. Constitutional:       General: She is not in acute distress. Appearance: She is well-developed. She is not ill-appearing. Eyes:      General: Lids are normal. Vision grossly intact. Cardiovascular:      Rate and Rhythm: Normal rate and regular rhythm. Heart sounds: Normal heart sounds, S1 normal and S2 normal. No murmur. No friction rub. No gallop. Pulmonary:      Effort: Pulmonary effort is normal. No respiratory distress. Breath sounds: Normal breath sounds. No wheezing. Abdominal:      General: Bowel sounds are normal.      Palpations: Abdomen is soft. There is no mass. Tenderness: There is no abdominal tenderness. There is no guarding. Musculoskeletal: Normal range of motion.    Skin: General: Skin is warm and dry. Capillary Refill: Capillary refill takes less than 2 seconds. Neurological:      General: No focal deficit present. Mental Status: She is alert and oriented to person, place, and time. Data Review  Hospital Outpatient Visit on 11/24/2020   Component Date Value    Glucose 11/24/2020 110*    BUN 11/24/2020 16     CREATININE 11/24/2020 0.57     Bun/Cre Ratio 11/24/2020 NOT REPORTED     Calcium 11/24/2020 9.4     Sodium 11/24/2020 137     Potassium 11/24/2020 4.5     Chloride 11/24/2020 99     CO2 11/24/2020 29     Anion Gap 11/24/2020 9     GFR Non- 11/24/2020 >60     GFR  11/24/2020 >60     GFR Comment 11/24/2020          GFR Staging 11/24/2020  [facility] Outpatient Visit on 11/19/2020   Component Date Value    Thyroxine, Free 11/19/2020 1.60     TSH 11/19/2020 0.16*   Office Visit on 11/17/2020   Component Date Value    Color, UA 11/17/2020 yellow     Clarity, UA 11/17/2020 clear     Glucose, UA POC 11/17/2020 neg     Blood, UA POC 11/17/2020 pos     Protein, UA POC 11/17/2020 15     Leukocytes, UA 11/17/2020 neg     Nitrite, UA 11/17/2020 neg           Assessment/Plan:      1. Acquired hypothyroidism  Continue Synthroid    2. Age-related osteoporosis without current pathological fracture  Continue Prolia, calcium and vitamin D supplementation. 3. Essential hypertension  Continue current regimen. 4. Hypercholesteremia  - atorvastatin (LIPITOR) 40 MG tablet; TAKE 1 TABLET DAILY  Dispense: 90 tablet; Refill: 1    5. Recurrent major depressive disorder, in partial remission (HCC)  - venlafaxine (EFFEXOR) 37.5 MG tablet; Take 1 tablet twice a day  Dispense: 180 tablet; Refill: 1    6. Gastroesophageal reflux disease, unspecified whether esophagitis present  - omeprazole (PRILOSEC) 20 MG delayed release capsule; Take 1 capsule every morning before breakfast  Dispense: 90 capsule;  Refill: 1 7. Atrophic vaginitis  - conjugated estrogens (PREMARIN) 0.625 MG/GM vaginal cream; Place 0.5 g vaginally daily for 5 days  Dispense: 1 Tube; Refill: 0           Health Maintenance Due   Topic Date Due    Diabetes screen  11/13/1990    Breast cancer screen  05/10/2017    Annual Wellness Visit (AWV)  05/29/2019       Electronically signed by Ritesh Perkins MD on 12/22/2020 at 4:49 PM

## 2020-12-23 RX ORDER — OMEPRAZOLE 20 MG/1
CAPSULE, DELAYED RELEASE ORAL
Qty: 90 CAPSULE | Refills: 3 | OUTPATIENT
Start: 2020-12-23

## 2020-12-23 RX ORDER — VENLAFAXINE 37.5 MG/1
TABLET ORAL
Qty: 180 TABLET | Refills: 3 | OUTPATIENT
Start: 2020-12-23

## 2020-12-24 ENCOUNTER — PATIENT MESSAGE (OUTPATIENT)
Dept: FAMILY MEDICINE CLINIC | Age: 70
End: 2020-12-24

## 2020-12-24 NOTE — TELEPHONE ENCOUNTER
From: Tasia Redding  To: Imani Momin MD  Sent: 12/24/2020 10:57 AM EST  Subject: Prescription Question    [facility] says that can't fill my prescriptions bc \"your doctor asked us not to fill your prescription. \" Can someone please remedy this? Thank you.

## 2020-12-27 ASSESSMENT — ENCOUNTER SYMPTOMS
CONSTIPATION: 0
NAUSEA: 0
BLOOD IN STOOL: 0
SHORTNESS OF BREATH: 0
COUGH: 0
ABDOMINAL PAIN: 0
WHEEZING: 0
CHEST TIGHTNESS: 0
VOMITING: 0
ANAL BLEEDING: 0
DIARRHEA: 0
CHOKING: 0

## 2020-12-27 ASSESSMENT — VISUAL ACUITY: OU: 1

## 2020-12-28 RX ORDER — ATORVASTATIN CALCIUM 40 MG/1
TABLET, FILM COATED ORAL
Qty: 90 TABLET | Refills: 1 | Status: SHIPPED | OUTPATIENT
Start: 2020-12-28

## 2020-12-28 RX ORDER — METOPROLOL SUCCINATE 50 MG/1
TABLET, EXTENDED RELEASE ORAL
Qty: 90 TABLET | Refills: 1 | Status: SHIPPED | OUTPATIENT
Start: 2020-12-28

## 2020-12-28 RX ORDER — OMEPRAZOLE 20 MG/1
CAPSULE, DELAYED RELEASE ORAL
Qty: 90 CAPSULE | Refills: 1 | Status: SHIPPED | OUTPATIENT
Start: 2020-12-28 | End: 2021-06-01

## 2020-12-28 RX ORDER — VENLAFAXINE 37.5 MG/1
TABLET ORAL
Qty: 180 TABLET | Refills: 1 | Status: SHIPPED | OUTPATIENT
Start: 2020-12-28 | End: 2021-06-01

## 2021-01-05 ENCOUNTER — PATIENT MESSAGE (OUTPATIENT)
Dept: FAMILY MEDICINE CLINIC | Age: 71
End: 2021-01-05

## 2021-01-05 DIAGNOSIS — N95.2 ATROPHIC VAGINITIS: ICD-10-CM

## 2021-01-05 RX ORDER — CONJUGATED ESTROGENS 0.62 MG/G
0.5 CREAM VAGINAL DAILY
Qty: 3 TUBE | Refills: 1 | Status: SHIPPED | OUTPATIENT
Start: 2021-01-05 | End: 2021-01-10

## 2021-01-05 NOTE — TELEPHONE ENCOUNTER
Last visit: 12/22/2020  Last Med refill: 12/22/2020  Sent to [address]  Does patient have enough medication for 72 hours: Yes    Next Visit Date:  Future Appointments   Date Time Provider Michael Yara   6/15/2021  1:20 PM Piper Andujar MD [address] [facility]   6/22/2021  2:00 PM Ludy Syed  [address] Maintenance   Topic Date Due    Diabetes screen  11/13/1990    Breast cancer screen  05/10/2017    Annual Wellness Visit (AWV)  05/29/2019    Lipid screen  03/02/2021    TSH testing  11/19/2021    Potassium monitoring  11/24/2021    Creatinine monitoring  11/24/2021    Colon cancer screen colonoscopy  11/07/2024    DTaP/Tdap/Td vaccine (2 - Td) 01/19/2025    DEXA (modify frequency per FRAX score)  Completed    Flu vaccine  Completed    Shingles Vaccine  Completed    Pneumococcal 65+ years Vaccine  Completed    Hepatitis C screen  Completed    Hepatitis A vaccine  Aged Out    Hepatitis B vaccine  Aged Out    Hib vaccine  Aged Out    Meningococcal (ACWY) vaccine  Aged Out       No results found for: LABA1C          ( goal A1C is < 7)   No results found for: LABMICR  LDL Cholesterol (mg/dL)   Date Value   03/02/2020 72   11/20/2018 52       (goal LDL is <100)   AST (U/L)   Date Value   03/02/2020 25     ALT (U/L)   Date Value   03/02/2020 32     BUN (mg/dL)   Date Value   11/24/2020 16     BP Readings from Last 3 Encounters:   12/22/20 132/80   12/15/20 122/70   11/17/20 (!) 174/104          (goal 120/80)    All Future Testing planned in [facility]  Lab Frequency Next Occurrence   JACQUELINE Digital Screen Bilateral [[id number]] Once 01/29/2021               Patient Active Problem List:     Coronary artery disease involving native coronary artery of native heart without angina pectoris     Hypercholesteremia     Hearing loss     Hiatal hernia     Acquired hypothyroidism     Age-related osteoporosis without current pathological fracture     Essential hypertension     Right foot pain Osteopenia of multiple sites     Recurrent major depressive disorder, in partial remission (HCC)     Gastroesophageal reflux disease

## 2021-01-13 ENCOUNTER — TELEPHONE (OUTPATIENT)
Dept: FAMILY MEDICINE CLINIC | Age: 71
End: 2021-01-13

## 2021-01-13 NOTE — TELEPHONE ENCOUNTER
[facility] called requesting clarification on  [facility]: [phone]  Ref: [id number]

## 2021-01-26 ENCOUNTER — HOSPITAL ENCOUNTER (OUTPATIENT)
Age: 71
Setting detail: SPECIMEN
Discharge: HOME | End: 2021-01-26
Payer: MEDICARE

## 2021-01-26 LAB
THYROXINE, FREE: 1.61 NG/DL (ref 0.93–1.7)
TSH SERPL DL<=0.05 MIU/L-ACNC: 1.02 MIU/L (ref 0.3–5)

## 2021-04-23 DIAGNOSIS — B37.2 CUTANEOUS CANDIDIASIS: ICD-10-CM

## 2021-04-23 RX ORDER — NYSTATIN 100000 [USP'U]/G
POWDER TOPICAL
Qty: 45 G | Refills: 5 | Status: SHIPPED | OUTPATIENT
Start: 2021-04-23

## 2021-04-23 NOTE — TELEPHONE ENCOUNTER
Last visit: 12/22/20  Last Med refill: 4/8/20  Does patient have enough medication for 72 hours: No:     Next Visit Date:  Future Appointments   Date Time Provider Michael Livingston   6/22/2021  2:00 PM Ludy Holder MD [id number]   6/29/2021  2:20 PM Jass Trevino MD [address] Maintenance   Topic Date Due    COVID-19 Vaccine (1) Never done    Diabetes screen  Never done    Breast cancer screen  05/10/2017    Annual Wellness Visit (AWV)  Never done    Lipid screen  03/02/2021    Potassium monitoring  11/24/2021    Creatinine monitoring  11/24/2021    TSH testing  01/26/2022    Colon cancer screen colonoscopy  11/07/2024    DTaP/Tdap/Td vaccine (2 - Td) 01/19/2025    DEXA (modify frequency per FRAX score)  Completed    Flu vaccine  Completed    Shingles Vaccine  Completed    Pneumococcal 65+ years Vaccine  Completed    Hepatitis C screen  Completed    Hepatitis A vaccine  Aged Out    Hepatitis B vaccine  Aged Out    Hib vaccine  Aged Out    Meningococcal (ACWY) vaccine  Aged Out       No results found for: LABA1C          ( goal A1C is < 7)   No results found for: LABMICR  LDL Cholesterol (mg/dL)   Date Value   03/02/2020 72   11/20/2018 52       (goal LDL is <100)   AST (U/L)   Date Value   03/02/2020 25     ALT (U/L)   Date Value   03/02/2020 32     BUN (mg/dL)   Date Value   11/24/2020 16     BP Readings from Last 3 Encounters:   12/22/20 132/80   12/15/20 122/70   11/17/20 (!) 174/104          (goal 120/80)    All Future Testing planned in [facility]  Lab Frequency Next Occurrence               Patient Active Problem List:     Coronary artery disease involving native coronary artery of native heart without angina pectoris     Hypercholesteremia     Hearing loss     Hiatal hernia     Acquired hypothyroidism     Age-related osteoporosis without current pathological fracture     Essential hypertension     Right foot pain     Osteopenia of multiple sites Recurrent major depressive disorder, in partial remission (HCC)     Gastroesophageal reflux disease

## 2021-05-25 ENCOUNTER — HOSPITAL ENCOUNTER (OUTPATIENT)
Age: 71
Setting detail: SPECIMEN
Discharge: HOME | End: 2021-05-25
Payer: MEDICARE

## 2021-05-25 DIAGNOSIS — R31.9 HEMATURIA, UNSPECIFIED TYPE: ICD-10-CM

## 2021-05-25 DIAGNOSIS — R31.9 HEMATURIA, UNSPECIFIED TYPE: Primary | ICD-10-CM

## 2021-05-25 DIAGNOSIS — R30.0 DYSURIA: ICD-10-CM

## 2021-05-25 NOTE — PROGRESS NOTES
Patient called in office stating that she was having some dysuria and hematuria and feels she may have a UTI. Adv patient will put order in system for urine culture.  Patient verbalized understanding

## 2021-05-26 LAB
CULTURE: NORMAL
Lab: NORMAL
SPECIMEN DESCRIPTION: NORMAL

## 2021-05-27 ENCOUNTER — OFFICE VISIT (OUTPATIENT)
Dept: UROLOGY | Age: 71
End: 2021-05-27
Payer: MEDICARE

## 2021-05-27 VITALS
WEIGHT: 197 LBS | SYSTOLIC BLOOD PRESSURE: 135 MMHG | DIASTOLIC BLOOD PRESSURE: 79 MMHG | RESPIRATION RATE: 17 BRPM | HEART RATE: 105 BPM | HEIGHT: 65 IN | BODY MASS INDEX: 32.82 KG/M2 | TEMPERATURE: 97.4 F

## 2021-05-27 DIAGNOSIS — N30.01 ACUTE CYSTITIS WITH HEMATURIA: ICD-10-CM

## 2021-05-27 DIAGNOSIS — R31.0 GROSS HEMATURIA: Primary | ICD-10-CM

## 2021-05-27 PROCEDURE — 99214 OFFICE O/P EST MOD 30 MIN: CPT | Performed by: UROLOGY

## 2021-05-27 RX ORDER — CEPHALEXIN 500 MG/1
500 CAPSULE ORAL 3 TIMES DAILY
Qty: 30 CAPSULE | Refills: 0 | Status: SHIPPED | OUTPATIENT
Start: 2021-05-27

## 2021-05-27 ASSESSMENT — ENCOUNTER SYMPTOMS
EYE REDNESS: 0
ABDOMINAL PAIN: 0
CONSTIPATION: 0
COUGH: 0
VOMITING: 0
NAUSEA: 0
SHORTNESS OF BREATH: 0
WHEEZING: 0
EYE PAIN: 0
DIARRHEA: 0
BACK PAIN: 0

## 2021-05-27 NOTE — PROGRESS NOTES
Review of Systems   Constitutional: Negative for appetite change, chills and fever. Eyes: Negative for pain, redness and visual disturbance. Respiratory: Negative for cough, shortness of breath and wheezing. Cardiovascular: Negative for chest pain and leg swelling. Gastrointestinal: Negative for abdominal pain, constipation, diarrhea, nausea and vomiting. Genitourinary: Positive for frequency, hematuria and urgency. Negative for difficulty urinating, dysuria and flank pain. Musculoskeletal: Negative for back pain, joint swelling and myalgias. Skin: Negative for rash and wound. Neurological: Negative for dizziness, tremors and numbness. Hematological: Does not bruise/bleed easily.    patient states she has pressure

## 2021-05-27 NOTE — PROGRESS NOTES
[address] [address]  Dept: [address] [facility] [place] Urology Office Note - Established    Patient:  Drake Rodriguez  YOB: 1950  Date: 5/27/2021    The patient is a 79 y.o. female whopresents today for evaluation of the following problems:   Chief Complaint   Patient presents with    Hematuria     no infection       HPI  Here for cont hematuria. Hematuria is gross. No dysuria, no pain. Urine cx is neg,   Strong stream, has some pressure    Summary of old records: N/A    Additional History: N/A    Procedures Today: N/A    Urinalysis today:  No results found for this visit on 05/27/21. Imaging Reviewed during this Office Visit: none  (results were independently reviewed by physician and radiology report verified)    AUA Symptom Score (5/27/2021):   INCOMPLETE EMPTYING: How often have you had the sensation of not emptying your bladder?: Not at all  FREQUENCY: How often do you have to urinate less than every two hours?: Not at all  INTERMITTENCY: How often have you found you stopped and started again several times when you urinated?: Not at all  URGENCY: How often have you found it difficult to postpone urination?: Not at all  WEAK STREAM: How often have you had a weak urinary stream?: Not at all  STRAINING: How often have you had to strain to start  urination?: Not at all  NOCTURIA: How many times did you typically get up at night to uriniate?: NONE  TOTAL I-PSS SCORE[de-identified] 0       Last BUN and creatinine:  Lab Results   Component Value Date    BUN 16 11/24/2020     Lab Results   Component Value Date    CREATININE 0.57 11/24/2020       Additional Lab/Culture results: none    PAST MEDICAL, FAMILY AND SOCIAL HISTORY UPDATE:  Past Medical History:   Diagnosis Date    Breast cancer ([facility] [address]     rt.side    CAD (coronary artery disease) 2006    cath EF 65%; small D1 90%    Ductal carcinoma in situ of right breast 11/2009 r breast lumpectomy    GERD (gastroesophageal reflux disease)     Hearing loss     Hiatal hernia 10/23/2012    type IV    Hiatal hernia with gastroesophageal reflux     Hypercholesteremia     Papillary thyroid carcinoma ([address] 2/2006    thyroidectomy    Proximal humerus fracture 3/4/2014     Past Surgical History:   Procedure Laterality Date    BREAST LUMPECTOMY  11/2009    right    CATARACT REMOVAL      COLONOSCOPY  08/26/2008    EYE SURGERY      retinal tear and detachment    EYE SURGERY      cataracts removed,torn retinal both sides,detached retina    HIP SURGERY Left 4/11/16    CRPP with spinal anesthesia    THYROIDECTOMY  3/2006     Family History   Problem Relation Age of Onset    Alzheimer's Disease Mother     Alzheimer's Disease Father     Cancer Sister         breast cancer    Arthritis Sister     Alcohol Abuse Sister      Outpatient Medications Marked as Taking for the 5/27/21 encounter (Office Visit) with Dinorah Garber MD   Medication Sig Dispense Refill    cephALEXin (KEFLEX) 500 MG capsule Take 1 capsule by mouth 3 times daily 30 capsule 0    nystatin (NYAMYC) 256267 UNIT/GM powder APPLY TO THE AFFECTED AREA THREE TIMES A DAY 45 g 5    atorvastatin (LIPITOR) 40 MG tablet TAKE 1 TABLET DAILY 90 tablet 1    venlafaxine (EFFEXOR) 37.5 MG tablet Take 1 tablet twice a day 180 tablet 1    omeprazole (PRILOSEC) 20 MG delayed release capsule Take 1 capsule every morning before breakfast 90 capsule 1    metoprolol succinate (TOPROL XL) 50 MG extended release tablet TAKE 1 TABLET DAILY 90 tablet 1    levothyroxine (SYNTHROID) 175 MCG tablet TAKE ONE TABLET BY MOUTH DAILY EXCEPT ON Saturday TAKE ONE AND ONE-HALF TABLET AS DIRECTED 30 tablet 0    Multiple Vitamins-Minerals (MULTIVITAMIN PO) Take by mouth      denosumab (PROLIA) 60 MG/ML SOLN SC injection Inject 60 mg into the skin once Every 6 months      vitamin D (CHOLECALCIFEROL) 1000 UNIT TABS tablet Take 1,000 Units by mouth daily      ibuprofen (ADVIL;MOTRIN) 200 MG tablet Take 200 mg by mouth every 6 hours as needed for Pain.  calcium carbonate (OSCAL) 500 MG TABS tablet Take 500 mg by mouth 2 times daily.  vitamin E 400 UNIT capsule Take 400 Units by mouth daily.  aspirin 81 MG tablet Take 81 mg by mouth daily. (All medications reviewed and updated by provider sincelast office visit or hospitalization)   Patient has no known allergies. Social History     Tobacco Use   Smoking Status Never Smoker   Smokeless Tobacco Never Used      (If patient a smoker, smoking cessation counseling offered)     Social History     Substance and Sexual Activity   Alcohol Use No       REVIEW OF SYSTEMS:  Review of Systems      Physical Exam:      Vitals:    05/27/21 0933   BP: 135/79   Pulse: 105   Resp: 17   Temp: 97.4 °F (36.3 °C)     Body mass index is 32.78 kg/m². Patient is a 79 y.o. female in noacute distress and alert and oriented to person, place and time. Physical Exam  Constitutional: Patient in no acute distress. Neuro: Alert andoriented to person, place and time. Psych: Mood normal, affect normal  Skin: No rash noted  HEENT: Head: Normocephalic and atraumatic  Conjunctivae and EOM are normal. Pupils are equal, round  Nose: Normal  Right External Ear: Normal; Left External Ear: Normal  Mouth: Mucosa Moist  Neck: Supple  Lungs: Respiratory effort is normal  Cardiovascular: Warm & Pink  Abdomen: Soft, non-tender, non-distended with no CVA,  No flank tenderness,  Or hepatosplenomegaly   Lymphatics: No palpable lymphadenopathy. Bladder non-tender and not distended. Musculoskeletal: Normalgait and station      and Plan      1. Gross hematuria    2. Acute cystitis with hematuria           Plan:    needs cysto, ureteroscopy to better evaluate  Ct urogram from nov was neg  No follow-ups on file.     Prescriptions Ordered:  Orders Placed This Encounter   Medications    cephALEXin (KEFLEX) 500 MG capsule     Sig: Take 1

## 2021-05-31 DIAGNOSIS — K21.9 GASTROESOPHAGEAL REFLUX DISEASE, UNSPECIFIED WHETHER ESOPHAGITIS PRESENT: ICD-10-CM

## 2021-05-31 DIAGNOSIS — F33.41 RECURRENT MAJOR DEPRESSIVE DISORDER, IN PARTIAL REMISSION (HCC): ICD-10-CM

## 2021-06-01 RX ORDER — OMEPRAZOLE 20 MG/1
CAPSULE, DELAYED RELEASE ORAL
Qty: 90 CAPSULE | Refills: 1 | Status: SHIPPED | OUTPATIENT
Start: 2021-06-01

## 2021-06-01 RX ORDER — VENLAFAXINE 37.5 MG/1
TABLET ORAL
Qty: 180 TABLET | Refills: 1 | Status: SHIPPED | OUTPATIENT
Start: 2021-06-01

## 2021-06-10 ENCOUNTER — TELEPHONE (OUTPATIENT)
Dept: FAMILY MEDICINE CLINIC | Age: 71
End: 2021-06-10

## 2021-06-10 NOTE — TELEPHONE ENCOUNTER
Patient's  called to let us know they have moved to [facility] and will be finding a doctor there.

## 2021-10-24 ENCOUNTER — PATIENT MESSAGE (OUTPATIENT)
Dept: FAMILY MEDICINE CLINIC | Age: 71
End: 2021-10-24